# Patient Record
Sex: FEMALE | Race: WHITE | NOT HISPANIC OR LATINO | URBAN - METROPOLITAN AREA
[De-identification: names, ages, dates, MRNs, and addresses within clinical notes are randomized per-mention and may not be internally consistent; named-entity substitution may affect disease eponyms.]

---

## 2017-02-07 ENCOUNTER — EMERGENCY (EMERGENCY)
Facility: HOSPITAL | Age: 42
LOS: 1 days | Discharge: HOME | End: 2017-02-07
Attending: EMERGENCY MEDICINE | Admitting: EMERGENCY MEDICINE
Payer: MEDICAID

## 2017-02-07 VITALS
HEART RATE: 74 BPM | SYSTOLIC BLOOD PRESSURE: 113 MMHG | RESPIRATION RATE: 18 BRPM | DIASTOLIC BLOOD PRESSURE: 75 MMHG | OXYGEN SATURATION: 100 % | TEMPERATURE: 99 F

## 2017-02-07 DIAGNOSIS — M25.561 PAIN IN RIGHT KNEE: ICD-10-CM

## 2017-02-07 DIAGNOSIS — Z90.49 ACQUIRED ABSENCE OF OTHER SPECIFIED PARTS OF DIGESTIVE TRACT: Chronic | ICD-10-CM

## 2017-02-07 DIAGNOSIS — Z90.49 ACQUIRED ABSENCE OF OTHER SPECIFIED PARTS OF DIGESTIVE TRACT: ICD-10-CM

## 2017-02-07 PROCEDURE — 99284 EMERGENCY DEPT VISIT MOD MDM: CPT

## 2017-02-07 PROCEDURE — 93971 EXTREMITY STUDY: CPT

## 2017-02-07 PROCEDURE — 99284 EMERGENCY DEPT VISIT MOD MDM: CPT | Mod: 25

## 2017-02-07 NOTE — ED PROVIDER NOTE - OBJECTIVE STATEMENT
Pt. pw atraumatic right popliteal knee pain x 3 days constant worse with movement improves with ibuprofen.  No ho blood clots, DVT/PE, not on ocp's has IUD in place without hormones  Denies weakness, numbness tingling, chest pain shortness of breath.

## 2017-02-07 NOTE — ED PROVIDER NOTE - PROGRESS NOTE DETAILS
Dr. Osborne (Attending Physician)  No DVT on ultrasound. Advised to take ibuprofen and Follow up with pmd.

## 2017-02-07 NOTE — ED PROVIDER NOTE - MEDICAL DECISION MAKING DETAILS
Dr. Osborne (Attending Physician)  Pt. with atraumatic right popliteal pain, no swelling to suggest dvt, worse with extension of knee.  Knee nontender to palpation.  Likely msk knee pain but will ultrasound to eval for DVT.

## 2017-02-07 NOTE — ED PROVIDER NOTE - MUSCULOSKELETAL, MLM
Spine appears normal, range of motion is not limited, no muscle or joint tenderness. right lower extremity without swelling or edema, pain with extension of right knee, knee nontender to palpation

## 2017-02-07 NOTE — ED ADULT NURSE NOTE - OBJECTIVE STATEMENT
41 year old female no medical history with co R lower leg behind the knee pain no injury worse upon waking up improves during day but still painful no redness no swelling not warm to touch denies injury ambulatory no other complaints seen by MD pending US

## 2017-02-08 PROCEDURE — 93971 EXTREMITY STUDY: CPT | Mod: 26

## 2017-03-18 ENCOUNTER — EMERGENCY (EMERGENCY)
Facility: HOSPITAL | Age: 42
LOS: 1 days | Discharge: HOME | End: 2017-03-18
Attending: EMERGENCY MEDICINE | Admitting: EMERGENCY MEDICINE
Payer: MEDICAID

## 2017-03-18 VITALS
HEART RATE: 69 BPM | TEMPERATURE: 98 F | SYSTOLIC BLOOD PRESSURE: 105 MMHG | RESPIRATION RATE: 16 BRPM | DIASTOLIC BLOOD PRESSURE: 71 MMHG | OXYGEN SATURATION: 100 %

## 2017-03-18 VITALS
HEART RATE: 73 BPM | TEMPERATURE: 98 F | RESPIRATION RATE: 14 BRPM | WEIGHT: 145.06 LBS | DIASTOLIC BLOOD PRESSURE: 85 MMHG | SYSTOLIC BLOOD PRESSURE: 123 MMHG | OXYGEN SATURATION: 100 %

## 2017-03-18 DIAGNOSIS — Z90.49 ACQUIRED ABSENCE OF OTHER SPECIFIED PARTS OF DIGESTIVE TRACT: Chronic | ICD-10-CM

## 2017-03-18 DIAGNOSIS — R07.89 OTHER CHEST PAIN: ICD-10-CM

## 2017-03-18 PROCEDURE — 99283 EMERGENCY DEPT VISIT LOW MDM: CPT | Mod: 25

## 2017-03-18 PROCEDURE — 93005 ELECTROCARDIOGRAM TRACING: CPT

## 2017-03-18 PROCEDURE — 93010 ELECTROCARDIOGRAM REPORT: CPT

## 2017-03-18 PROCEDURE — 99284 EMERGENCY DEPT VISIT MOD MDM: CPT | Mod: 25

## 2017-03-18 NOTE — ED PROVIDER NOTE - PLAN OF CARE
1) Please return to the ED should you have any new or worsening symptoms, worsening pain, develop chest pain, difficulty breathing, nausea, vomiting, or any concerning symptoms  2) Please follow up with your primary doctor in 2-3 days

## 2017-03-18 NOTE — ED ADULT NURSE NOTE - CHPI ED SYMPTOMS NEG
no syncope/no vomiting/no shortness of breath/no nausea/no dizziness/no cough/no fever/no diaphoresis/no back pain

## 2017-03-18 NOTE — ED PROVIDER NOTE - OBJECTIVE STATEMENT
41 y/o female with no PMH p/w fatigue and 1 episode of CP with dizziness. Per patient, this evening felt fatigued and dizzy, had 1 second of chest pressure at rest that resolved. Lay down and took advil - when she awoke felt mild improvement in her symptoms. Currently has no CP. Reports a mild posterior occiput headache. Denies and dizziness, CP, SOB, cough, N/V/D, diaphoresis. On no medications. Has no allergies.  Dr. Moreno

## 2017-03-18 NOTE — ED PROVIDER NOTE - CONSTITUTIONAL, MLM
normal... Well appearing, well nourished, awake, alert, oriented to person, place, time/situation and in no apparent distress. Sitting up in bed comfortably talking with family.

## 2017-03-18 NOTE — ED PROVIDER NOTE - ATTENDING CONTRIBUTION TO CARE
------------ATTENDING NOTE------------   healthy 43 yo F c/o chest pain, pt describes > 24 hrs ago having gradual onset of mild dull frontal headache, then had mild dull ache in L chest lasting several minutes, no radiation, no sob or dyspnea, no exertional symptoms, no cough, CP resolved and has been absent since, has felt diffuse "chills" at times since and generalized weak at times, came to ED today as felt slight generalized weak today -- overall very well appearing, likely viral -vs- other, very low suspicion for more serious/acute cardiopulmonary process, nml VS, tolerating PO, asymptomatic here, in depth d/w pt/family about ddx, tx, bang goodrich.  - Ray Khan MD   ----------------------------------------------------------------------------------------------------------------------

## 2017-03-18 NOTE — ED PROVIDER NOTE - CARE PLAN
Principal Discharge DX:	Chest pain of unknown etiology Principal Discharge DX:	Chest pain of unknown etiology  Instructions for follow-up, activity and diet:	1) Please return to the ED should you have any new or worsening symptoms, worsening pain, develop chest pain, difficulty breathing, nausea, vomiting, or any concerning symptoms  2) Please follow up with your primary doctor in 2-3 days

## 2017-03-18 NOTE — ED PROVIDER NOTE - NEUROLOGICAL, MLM
Alert and oriented, no focal deficits, no motor or sensory deficits. CN2-12 grossly intact. Normal gait.

## 2017-03-18 NOTE — ED ADULT NURSE NOTE - OBJECTIVE STATEMENT
41 yo female A&OX3 presents to the ED with the c/o generalized chest tightness. Pt states tightness started yesterday and has been ongoing till today. According to the pt she is having chills. Lungs clear, equal b/l no cough and no sob or diaphoresis. Abd round, soft non tender and non distended. MAEX4

## 2017-03-18 NOTE — ED PROVIDER NOTE - INTERPRETATION
normal sinus rhythm, Normal axis, Normal GA interval and QRS complex. There are no acute ischemic ST or T-wave changes.

## 2017-10-31 ENCOUNTER — RESULT REVIEW (OUTPATIENT)
Age: 42
End: 2017-10-31

## 2018-03-20 PROBLEM — Z00.00 ENCOUNTER FOR PREVENTIVE HEALTH EXAMINATION: Status: ACTIVE | Noted: 2018-03-20

## 2018-03-27 ENCOUNTER — RECORD ABSTRACTING (OUTPATIENT)
Age: 43
End: 2018-03-27

## 2018-03-28 ENCOUNTER — APPOINTMENT (OUTPATIENT)
Dept: GYNECOLOGIC ONCOLOGY | Facility: CLINIC | Age: 43
End: 2018-03-28
Payer: COMMERCIAL

## 2018-03-28 VITALS
HEART RATE: 101 BPM | HEIGHT: 63 IN | SYSTOLIC BLOOD PRESSURE: 128 MMHG | DIASTOLIC BLOOD PRESSURE: 82 MMHG | BODY MASS INDEX: 27.46 KG/M2 | WEIGHT: 155 LBS

## 2018-03-28 DIAGNOSIS — N94.9 UNSPECIFIED CONDITION ASSOCIATED WITH FEMALE GENITAL ORGANS AND MENSTRUAL CYCLE: ICD-10-CM

## 2018-03-28 PROCEDURE — 99205 OFFICE O/P NEW HI 60 MIN: CPT

## 2018-03-30 ENCOUNTER — OUTPATIENT (OUTPATIENT)
Dept: OUTPATIENT SERVICES | Facility: HOSPITAL | Age: 43
LOS: 1 days | End: 2018-03-30
Payer: COMMERCIAL

## 2018-03-30 VITALS
TEMPERATURE: 98 F | DIASTOLIC BLOOD PRESSURE: 70 MMHG | HEIGHT: 63 IN | WEIGHT: 156.09 LBS | HEART RATE: 79 BPM | SYSTOLIC BLOOD PRESSURE: 110 MMHG | RESPIRATION RATE: 16 BRPM

## 2018-03-30 DIAGNOSIS — N94.9 UNSPECIFIED CONDITION ASSOCIATED WITH FEMALE GENITAL ORGANS AND MENSTRUAL CYCLE: ICD-10-CM

## 2018-03-30 DIAGNOSIS — Z90.49 ACQUIRED ABSENCE OF OTHER SPECIFIED PARTS OF DIGESTIVE TRACT: Chronic | ICD-10-CM

## 2018-03-30 LAB
ALBUMIN SERPL ELPH-MCNC: 4.6 G/DL — SIGNIFICANT CHANGE UP (ref 3.3–5)
ALP SERPL-CCNC: 53 U/L — SIGNIFICANT CHANGE UP (ref 40–120)
ALT FLD-CCNC: 17 U/L — SIGNIFICANT CHANGE UP (ref 4–33)
AST SERPL-CCNC: 22 U/L — SIGNIFICANT CHANGE UP (ref 4–32)
BILIRUB SERPL-MCNC: 0.4 MG/DL — SIGNIFICANT CHANGE UP (ref 0.2–1.2)
BLD GP AB SCN SERPL QL: NEGATIVE — SIGNIFICANT CHANGE UP
BUN SERPL-MCNC: 8 MG/DL — SIGNIFICANT CHANGE UP (ref 7–23)
CALCIUM SERPL-MCNC: 9.9 MG/DL — SIGNIFICANT CHANGE UP (ref 8.4–10.5)
CHLORIDE SERPL-SCNC: 99 MMOL/L — SIGNIFICANT CHANGE UP (ref 98–107)
CO2 SERPL-SCNC: 28 MMOL/L — SIGNIFICANT CHANGE UP (ref 22–31)
CREAT SERPL-MCNC: 0.65 MG/DL — SIGNIFICANT CHANGE UP (ref 0.5–1.3)
GLUCOSE SERPL-MCNC: 80 MG/DL — SIGNIFICANT CHANGE UP (ref 70–99)
HBA1C BLD-MCNC: 4.5 % — SIGNIFICANT CHANGE UP (ref 4–5.6)
HCT VFR BLD CALC: 40 % — SIGNIFICANT CHANGE UP (ref 34.5–45)
HGB BLD-MCNC: 12.4 G/DL — SIGNIFICANT CHANGE UP (ref 11.5–15.5)
MCHC RBC-ENTMCNC: 31 PG — SIGNIFICANT CHANGE UP (ref 27–34)
MCHC RBC-ENTMCNC: 31.4 % — LOW (ref 32–36)
MCV RBC AUTO: 93.6 FL — SIGNIFICANT CHANGE UP (ref 80–100)
NRBC # FLD: 0 — SIGNIFICANT CHANGE UP
PLATELET # BLD AUTO: 324 K/UL — SIGNIFICANT CHANGE UP (ref 150–400)
PMV BLD: 10 FL — SIGNIFICANT CHANGE UP (ref 7–13)
POTASSIUM SERPL-MCNC: 4.6 MMOL/L — SIGNIFICANT CHANGE UP (ref 3.5–5.3)
POTASSIUM SERPL-SCNC: 4.6 MMOL/L — SIGNIFICANT CHANGE UP (ref 3.5–5.3)
PROT SERPL-MCNC: 8.2 G/DL — SIGNIFICANT CHANGE UP (ref 6–8.3)
RBC # BLD: 2.34 M/UL — LOW (ref 3.8–5.2)
RBC # FLD: 12.6 % — SIGNIFICANT CHANGE UP (ref 10.3–14.5)
RH IG SCN BLD-IMP: POSITIVE — SIGNIFICANT CHANGE UP
SODIUM SERPL-SCNC: 138 MMOL/L — SIGNIFICANT CHANGE UP (ref 135–145)
WBC # BLD: 5.26 K/UL — SIGNIFICANT CHANGE UP (ref 3.8–10.5)
WBC # FLD AUTO: 5.26 K/UL — SIGNIFICANT CHANGE UP (ref 3.8–10.5)

## 2018-03-30 PROCEDURE — 93010 ELECTROCARDIOGRAM REPORT: CPT

## 2018-03-30 NOTE — H&P PST ADULT - NEGATIVE NEUROLOGICAL SYMPTOMS
no headache/no vertigo/no difficulty walking/no transient paralysis/no weakness/no paresthesias/no generalized seizures/no loss of sensation

## 2018-03-30 NOTE — H&P PST ADULT - HISTORY OF PRESENT ILLNESS
42 yo female with no pertinent PMH presents to pre surgical testing.  Pt report she started to experience right knee pain for about 1 year, went to orthopedist, biopsy done, positive for lymphoma.  Pt reports bone marrow biopsy earlier this week, pending results.  Pt reports PET scan done 3/2018, revealed right ovarian mass, possible malignancy.  Pt reports MRI and sonogram done as well.  Pt is scheduled for examination under anesthesia, laparoscopic unilateral salpingo oophorectomy, bilateral salpingectomy, possible staging debulking hysterectomy, port placement on 4/3/18.

## 2018-03-30 NOTE — H&P PST ADULT - NSANTHOSAYNRD_GEN_A_CORE
No. JERZY screening performed.  STOP BANG Legend: 0-2 = LOW Risk; 3-4 = INTERMEDIATE Risk; 5-8 = HIGH Risk

## 2018-03-30 NOTE — H&P PST ADULT - NEGATIVE CARDIOVASCULAR SYMPTOMS
no claudication/no peripheral edema/no chest pain/no palpitations/no dyspnea on exertion/no orthopnea/no paroxysmal nocturnal dyspnea

## 2018-03-30 NOTE — H&P PST ADULT - NEGATIVE MUSCULOSKELETAL SYMPTOMS
no joint swelling/no back pain/no leg pain L/no neck pain/no arm pain R/no muscle cramps/no muscle weakness/no myalgia/no arm pain L/no arthralgia/no stiffness

## 2018-03-30 NOTE — H&P PST ADULT - GENITOURINARY COMMENTS
"right ovarian mass" preop dx. unspecified condition associated with female genital organs and menstrual cycle

## 2018-03-30 NOTE — H&P PST ADULT - PROBLEM SELECTOR PLAN 1
Pt is scheduled for examination under anesthesia, laparoscopic unilateral salpingo oophorectomy, bilateral salpingectomy, possible staging debulking hysterectomy, port placement on 4/3/18.   Pre op instructions including chlorhexidine wash given and pt able to verbalize understanding.   Sterile cup given, pt instructed to bring urine sample AM of surgery for UCG and pt able to verbalize understanding.

## 2018-03-30 NOTE — H&P PST ADULT - MUSCULOSKELETAL
detailed exam no joint erythema/right knee/decreased ROM due to pain/no joint warmth/no calf tenderness/joint swelling details…

## 2018-03-30 NOTE — H&P PST ADULT - NEGATIVE ENMT SYMPTOMS
no dysphagia/no ear pain/no nasal congestion/no hearing difficulty/no tinnitus/no vertigo/no sinus symptoms

## 2018-04-02 ENCOUNTER — TRANSCRIPTION ENCOUNTER (OUTPATIENT)
Age: 43
End: 2018-04-02

## 2018-04-02 ENCOUNTER — RESULT REVIEW (OUTPATIENT)
Age: 43
End: 2018-04-02

## 2018-04-02 DIAGNOSIS — R19.00 INTRA-ABDOMINAL AND PELVIC SWELLING, MASS AND LUMP, UNSPECIFIED SITE: ICD-10-CM

## 2018-04-03 ENCOUNTER — RESULT REVIEW (OUTPATIENT)
Age: 43
End: 2018-04-03

## 2018-04-03 ENCOUNTER — APPOINTMENT (OUTPATIENT)
Dept: GYNECOLOGIC ONCOLOGY | Facility: HOSPITAL | Age: 43
End: 2018-04-03

## 2018-04-03 ENCOUNTER — OUTPATIENT (OUTPATIENT)
Dept: OUTPATIENT SERVICES | Facility: HOSPITAL | Age: 43
LOS: 1 days | Discharge: HOME | End: 2018-04-03
Payer: COMMERCIAL

## 2018-04-03 VITALS
RESPIRATION RATE: 17 BRPM | TEMPERATURE: 98 F | HEART RATE: 65 BPM | SYSTOLIC BLOOD PRESSURE: 101 MMHG | OXYGEN SATURATION: 100 % | DIASTOLIC BLOOD PRESSURE: 61 MMHG

## 2018-04-03 VITALS
SYSTOLIC BLOOD PRESSURE: 113 MMHG | DIASTOLIC BLOOD PRESSURE: 66 MMHG | RESPIRATION RATE: 16 BRPM | HEIGHT: 63 IN | TEMPERATURE: 98 F | HEART RATE: 82 BPM | WEIGHT: 156.09 LBS | OXYGEN SATURATION: 99 %

## 2018-04-03 DIAGNOSIS — N94.9 UNSPECIFIED CONDITION ASSOCIATED WITH FEMALE GENITAL ORGANS AND MENSTRUAL CYCLE: ICD-10-CM

## 2018-04-03 DIAGNOSIS — Z90.49 ACQUIRED ABSENCE OF OTHER SPECIFIED PARTS OF DIGESTIVE TRACT: Chronic | ICD-10-CM

## 2018-04-03 LAB
BLD GP AB SCN SERPL QL: NEGATIVE — SIGNIFICANT CHANGE UP
DAT C3-SP REAG RBC QL: NEGATIVE — SIGNIFICANT CHANGE UP
DAT POLY-SP REAG RBC QL: NEGATIVE — SIGNIFICANT CHANGE UP
DIRECT COOMBS IGG: NEGATIVE — SIGNIFICANT CHANGE UP
HCG UR QL: NEGATIVE — SIGNIFICANT CHANGE UP
RH IG SCN BLD-IMP: POSITIVE — SIGNIFICANT CHANGE UP

## 2018-04-03 PROCEDURE — 88360 TUMOR IMMUNOHISTOCHEM/MANUAL: CPT | Mod: 26

## 2018-04-03 PROCEDURE — 88112 CYTOPATH CELL ENHANCE TECH: CPT | Mod: 26

## 2018-04-03 PROCEDURE — 88189 FLOWCYTOMETRY/READ 16 & >: CPT

## 2018-04-03 PROCEDURE — 58661 LAPAROSCOPY REMOVE ADNEXA: CPT

## 2018-04-03 PROCEDURE — 88307 TISSUE EXAM BY PATHOLOGIST: CPT | Mod: 26

## 2018-04-03 PROCEDURE — 88305 TISSUE EXAM BY PATHOLOGIST: CPT | Mod: 26,59

## 2018-04-03 PROCEDURE — 86077 PHYS BLOOD BANK SERV XMATCH: CPT

## 2018-04-03 PROCEDURE — 88341 IMHCHEM/IMCYTCHM EA ADD ANTB: CPT | Mod: 26,59

## 2018-04-03 PROCEDURE — 88305 TISSUE EXAM BY PATHOLOGIST: CPT | Mod: 26

## 2018-04-03 PROCEDURE — 88342 IMHCHEM/IMCYTCHM 1ST ANTB: CPT | Mod: 26,59

## 2018-04-03 RX ORDER — FENTANYL CITRATE 50 UG/ML
50 INJECTION INTRAVENOUS
Qty: 0 | Refills: 0 | Status: DISCONTINUED | OUTPATIENT
Start: 2018-04-03 | End: 2018-04-03

## 2018-04-03 RX ORDER — SODIUM CHLORIDE 9 MG/ML
1000 INJECTION, SOLUTION INTRAVENOUS
Qty: 0 | Refills: 0 | Status: DISCONTINUED | OUTPATIENT
Start: 2018-04-03 | End: 2018-04-18

## 2018-04-03 RX ORDER — IBUPROFEN 200 MG
3 TABLET ORAL
Qty: 60 | Refills: 0 | OUTPATIENT
Start: 2018-04-03 | End: 2018-04-07

## 2018-04-03 RX ORDER — ONDANSETRON 8 MG/1
4 TABLET, FILM COATED ORAL ONCE
Qty: 0 | Refills: 0 | Status: COMPLETED | OUTPATIENT
Start: 2018-04-03 | End: 2018-04-03

## 2018-04-03 RX ORDER — ACETAMINOPHEN 500 MG
3 TABLET ORAL
Qty: 36 | Refills: 0 | OUTPATIENT
Start: 2018-04-03 | End: 2018-04-06

## 2018-04-03 RX ADMIN — Medication 200 MILLIGRAM(S): at 18:44

## 2018-04-03 RX ADMIN — ONDANSETRON 4 MILLIGRAM(S): 8 TABLET, FILM COATED ORAL at 17:44

## 2018-04-03 NOTE — ASU DISCHARGE PLAN (ADULT/PEDIATRIC). - NURSING INSTRUCTIONS
No creams, lotions, powders  or ointments to incision site. Do not scrub incisions while showering. Pat dry after shower.     DO NOT take any Tylenol (Acetaminophen) or narcotics containing Tylenol (PERCOCET) until after 7:00pm . You received Tylenol during your operation and it can cause damage to your liver if too much is taken within a 24 hour time period.

## 2018-04-03 NOTE — ASU DISCHARGE PLAN (ADULT/PEDIATRIC). - MEDICATION SUMMARY - MEDICATIONS TO TAKE
I will START or STAY ON the medications listed below when I get home from the hospital:    Tylenol 325 mg oral tablet  -- 3 tab(s) by mouth every 8 hours MDD:4  -- This product contains acetaminophen.  Do not use  with any other product containing acetaminophen to prevent possible liver damage.    -- Indication: For pain    Motrin  mg oral tablet  -- 3 tab(s) by mouth every 6 hours MDD:4   -- Do not take this drug if you are pregnant.  It is very important that you take or use this exactly as directed.  Do not skip doses or discontinue unless directed by your doctor.  May cause drowsiness or dizziness.  Obtain medical advice before taking any non-prescription drugs as some may affect the action of this medication.  Take with food or milk.    -- Indication: For pain I will START or STAY ON the medications listed below when I get home from the hospital:    Motrin  mg oral tablet  -- 3 tab(s) by mouth every 6 hours MDD:4   -- Do not take this drug if you are pregnant.  It is very important that you take or use this exactly as directed.  Do not skip doses or discontinue unless directed by your doctor.  May cause drowsiness or dizziness.  Obtain medical advice before taking any non-prescription drugs as some may affect the action of this medication.  Take with food or milk.    -- Indication: For moderate pain    Percocet 5/325 oral tablet  -- 1 tab(s) by mouth every 6 hours MDD:4  -- Caution federal law prohibits the transfer of this drug to any person other  than the person for whom it was prescribed.  May cause drowsiness.  Alcohol may intensify this effect.  Use care when operating dangerous machinery.  This prescription cannot be refilled.  This product contains acetaminophen.  Do not use  with any other product containing acetaminophen to prevent possible liver damage.  Using more of this medication than prescribed may cause serious breathing problems.    -- Indication: For severe pain

## 2018-04-03 NOTE — ASU DISCHARGE PLAN (ADULT/PEDIATRIC). - SPECIAL INSTRUCTIONS
Take tylenol as need for mild pain. Take motrin as needed for moderate pain Take motrin as needed for moderate pain. take percocet as needed for severe pain

## 2018-04-03 NOTE — ASU DISCHARGE PLAN (ADULT/PEDIATRIC). - ACTIVITY LEVEL
nothing per rectum/no tampons/no intercourse/no douching/weight bearing as tolerated nothing per rectum/no tampons/no intercourse/no douching/weight bearing as tolerated/no tub baths

## 2018-04-03 NOTE — ASU DISCHARGE PLAN (ADULT/PEDIATRIC). - INSTRUCTIONS
Call MD office to schedule follow up appointment Keep first meal light. Nothing fried, spicy or greasy. Increase fluids.

## 2018-04-03 NOTE — ASU DISCHARGE PLAN (ADULT/PEDIATRIC). - PAIN
prescription called to pharmacy/Narcotic pain medication may cause nausea or constipation. Take medication with food. Increase fluids and fiber intake.

## 2018-04-05 LAB — ANTIBODY ID 1_1: SIGNIFICANT CHANGE UP

## 2018-04-06 ENCOUNTER — OUTPATIENT (OUTPATIENT)
Dept: OUTPATIENT SERVICES | Facility: HOSPITAL | Age: 43
LOS: 1 days | Discharge: HOME | End: 2018-04-06
Payer: COMMERCIAL

## 2018-04-06 DIAGNOSIS — C85.90 NON-HODGKIN LYMPHOMA, UNSPECIFIED, UNSPECIFIED SITE: ICD-10-CM

## 2018-04-06 DIAGNOSIS — Z90.49 ACQUIRED ABSENCE OF OTHER SPECIFIED PARTS OF DIGESTIVE TRACT: Chronic | ICD-10-CM

## 2018-04-10 ENCOUNTER — RESULT REVIEW (OUTPATIENT)
Age: 43
End: 2018-04-10

## 2018-04-10 ENCOUNTER — APPOINTMENT (OUTPATIENT)
Dept: HEMATOLOGY ONCOLOGY | Facility: CLINIC | Age: 43
End: 2018-04-10
Payer: COMMERCIAL

## 2018-04-10 VITALS
WEIGHT: 153.22 LBS | HEART RATE: 73 BPM | BODY MASS INDEX: 26.48 KG/M2 | SYSTOLIC BLOOD PRESSURE: 125 MMHG | HEIGHT: 63.9 IN | OXYGEN SATURATION: 99 % | RESPIRATION RATE: 16 BRPM | DIASTOLIC BLOOD PRESSURE: 77 MMHG | TEMPERATURE: 97.7 F

## 2018-04-10 LAB
BASOPHILS # BLD AUTO: 0.1 K/UL — SIGNIFICANT CHANGE UP (ref 0–0.2)
BASOPHILS NFR BLD AUTO: 1 % — SIGNIFICANT CHANGE UP (ref 0–2)
EOSINOPHIL # BLD AUTO: 0.5 K/UL — SIGNIFICANT CHANGE UP (ref 0–0.5)
EOSINOPHIL NFR BLD AUTO: 7.3 % — HIGH (ref 0–6)
HCT VFR BLD CALC: 38.6 % — SIGNIFICANT CHANGE UP (ref 34.5–45)
HGB BLD-MCNC: 13.4 G/DL — SIGNIFICANT CHANGE UP (ref 11.5–15.5)
LYMPHOCYTES # BLD AUTO: 1.5 K/UL — SIGNIFICANT CHANGE UP (ref 1–3.3)
LYMPHOCYTES # BLD AUTO: 21.5 % — SIGNIFICANT CHANGE UP (ref 13–44)
MCHC RBC-ENTMCNC: 30.4 PG — SIGNIFICANT CHANGE UP (ref 27–34)
MCHC RBC-ENTMCNC: 34.6 G/DL — SIGNIFICANT CHANGE UP (ref 32–36)
MCV RBC AUTO: 87.9 FL — SIGNIFICANT CHANGE UP (ref 80–100)
MONOCYTES # BLD AUTO: 0.4 K/UL — SIGNIFICANT CHANGE UP (ref 0–0.9)
MONOCYTES NFR BLD AUTO: 6.2 % — SIGNIFICANT CHANGE UP (ref 2–14)
NEUTROPHILS # BLD AUTO: 4.5 K/UL — SIGNIFICANT CHANGE UP (ref 1.8–7.4)
NEUTROPHILS NFR BLD AUTO: 64 % — SIGNIFICANT CHANGE UP (ref 43–77)
PLATELET # BLD AUTO: 338 K/UL — SIGNIFICANT CHANGE UP (ref 150–400)
RBC # BLD: 4.4 M/UL — SIGNIFICANT CHANGE UP (ref 3.8–5.2)
RBC # FLD: 12 % — SIGNIFICANT CHANGE UP (ref 10.3–14.5)
WBC # BLD: 7 K/UL — SIGNIFICANT CHANGE UP (ref 3.8–10.5)
WBC # FLD AUTO: 7 K/UL — SIGNIFICANT CHANGE UP (ref 3.8–10.5)

## 2018-04-10 PROCEDURE — 99205 OFFICE O/P NEW HI 60 MIN: CPT

## 2018-04-10 RX ORDER — METOCLOPRAMIDE 10 MG/1
10 TABLET ORAL
Qty: 30 | Refills: 1 | Status: ACTIVE | COMMUNITY
Start: 2018-04-10 | End: 1900-01-01

## 2018-04-11 ENCOUNTER — APPOINTMENT (OUTPATIENT)
Dept: THORACIC SURGERY | Facility: CLINIC | Age: 43
End: 2018-04-11
Payer: COMMERCIAL

## 2018-04-11 ENCOUNTER — OTHER (OUTPATIENT)
Age: 43
End: 2018-04-11

## 2018-04-11 DIAGNOSIS — Z85.79 PERSONAL HISTORY OF OTHER MALIGNANT NEOPLASMS OF LYMPHOID, HEMATOPOIETIC AND RELATED TISSUES: ICD-10-CM

## 2018-04-11 DIAGNOSIS — N83.9 NONINFLAMMATORY DISORDER OF OVARY, FALLOPIAN TUBE AND BROAD LIGAMENT, UNSPECIFIED: ICD-10-CM

## 2018-04-11 LAB
ALBUMIN SERPL ELPH-MCNC: 4.6 G/DL
ALP BLD-CCNC: 54 U/L
ALT SERPL-CCNC: 42 U/L
ANION GAP SERPL CALC-SCNC: 14 MMOL/L
APTT BLD: 32.6 SEC
AST SERPL-CCNC: 17 U/L
BILIRUB SERPL-MCNC: 0.3 MG/DL
BUN SERPL-MCNC: 7 MG/DL
CALCIUM SERPL-MCNC: 9.6 MG/DL
CHLORIDE SERPL-SCNC: 100 MMOL/L
CO2 SERPL-SCNC: 25 MMOL/L
CREAT SERPL-MCNC: 0.62 MG/DL
GLUCOSE SERPL-MCNC: 121 MG/DL
INR PPP: 1.03 RATIO
LDH SERPL-CCNC: 227 U/L
POTASSIUM SERPL-SCNC: 4.5 MMOL/L
PROT SERPL-MCNC: 8.3 G/DL
PT BLD: 11.6 SEC
SODIUM SERPL-SCNC: 139 MMOL/L
URATE SERPL-MCNC: 3 MG/DL

## 2018-04-11 PROCEDURE — 99205 OFFICE O/P NEW HI 60 MIN: CPT

## 2018-04-11 RX ORDER — PREDNISONE 50 MG/1
50 TABLET ORAL
Qty: 16 | Refills: 2 | Status: ACTIVE | COMMUNITY
Start: 2018-04-11 | End: 1900-01-01

## 2018-04-12 VITALS
BODY MASS INDEX: 26.44 KG/M2 | WEIGHT: 153 LBS | HEART RATE: 83 BPM | RESPIRATION RATE: 16 BRPM | DIASTOLIC BLOOD PRESSURE: 65 MMHG | HEIGHT: 63.9 IN | OXYGEN SATURATION: 98 % | SYSTOLIC BLOOD PRESSURE: 105 MMHG

## 2018-04-12 PROBLEM — N83.9 MASS OF OVARY: Status: RESOLVED | Noted: 2018-04-12 | Resolved: 2018-04-12

## 2018-04-12 PROBLEM — Z85.79 HISTORY OF LYMPHOMA: Status: RESOLVED | Noted: 2018-04-12 | Resolved: 2018-04-12

## 2018-04-13 ENCOUNTER — APPOINTMENT (OUTPATIENT)
Dept: THORACIC SURGERY | Facility: HOSPITAL | Age: 43
End: 2018-04-13

## 2018-04-13 ENCOUNTER — OUTPATIENT (OUTPATIENT)
Dept: OUTPATIENT SERVICES | Facility: HOSPITAL | Age: 43
LOS: 1 days | Discharge: HOME | End: 2018-04-13
Payer: COMMERCIAL

## 2018-04-13 VITALS
RESPIRATION RATE: 16 BRPM | WEIGHT: 156.09 LBS | DIASTOLIC BLOOD PRESSURE: 64 MMHG | OXYGEN SATURATION: 98 % | HEART RATE: 69 BPM | TEMPERATURE: 98 F | HEIGHT: 63 IN | SYSTOLIC BLOOD PRESSURE: 100 MMHG

## 2018-04-13 VITALS
DIASTOLIC BLOOD PRESSURE: 70 MMHG | HEART RATE: 57 BPM | SYSTOLIC BLOOD PRESSURE: 113 MMHG | TEMPERATURE: 98 F | RESPIRATION RATE: 16 BRPM | OXYGEN SATURATION: 99 %

## 2018-04-13 DIAGNOSIS — Z98.890 OTHER SPECIFIED POSTPROCEDURAL STATES: Chronic | ICD-10-CM

## 2018-04-13 DIAGNOSIS — Z90.49 ACQUIRED ABSENCE OF OTHER SPECIFIED PARTS OF DIGESTIVE TRACT: Chronic | ICD-10-CM

## 2018-04-13 DIAGNOSIS — C85.90 NON-HODGKIN LYMPHOMA, UNSPECIFIED, UNSPECIFIED SITE: ICD-10-CM

## 2018-04-13 LAB — HCG UR QL: NEGATIVE — SIGNIFICANT CHANGE UP

## 2018-04-13 PROCEDURE — 77001 FLUOROGUIDE FOR VEIN DEVICE: CPT | Mod: 26

## 2018-04-13 PROCEDURE — 36561 INSERT TUNNELED CV CATH: CPT | Mod: LT

## 2018-04-13 PROCEDURE — 71045 X-RAY EXAM CHEST 1 VIEW: CPT | Mod: 26

## 2018-04-13 NOTE — BRIEF OPERATIVE NOTE - PROCEDURE
<<-----Click on this checkbox to enter Procedure Venous access port placement  04/13/2018    Active  GERI

## 2018-04-13 NOTE — ASU DISCHARGE PLAN (ADULT/PEDIATRIC). - MEDICATION SUMMARY - MEDICATIONS TO CHANGE
I will SWITCH the dose or number of times a day I take the medications listed below when I get home from the hospital:    Motrin  mg oral tablet  -- 3 tab(s) by mouth every 6 hours MDD:4   -- Do not take this drug if you are pregnant.  It is very important that you take or use this exactly as directed.  Do not skip doses or discontinue unless directed by your doctor.  May cause drowsiness or dizziness.  Obtain medical advice before taking any non-prescription drugs as some may affect the action of this medication.  Take with food or milk.    Percocet 5/325 oral tablet  -- 1 tab(s) by mouth every 6 hours MDD:4  -- Caution federal law prohibits the transfer of this drug to any person other  than the person for whom it was prescribed.  May cause drowsiness.  Alcohol may intensify this effect.  Use care when operating dangerous machinery.  This prescription cannot be refilled.  This product contains acetaminophen.  Do not use  with any other product containing acetaminophen to prevent possible liver damage.  Using more of this medication than prescribed may cause serious breathing problems.

## 2018-04-13 NOTE — ASU DISCHARGE PLAN (ADULT/PEDIATRIC). - NOTIFY
Swelling that continues/Fever greater than 101/Bleeding that does not stop/Pain not relieved by Medications/shortness of breath/Numbness, color, or temperature change to extremity

## 2018-04-13 NOTE — ASU DISCHARGE PLAN (ADULT/PEDIATRIC). - BATHING
keep surgical dressing dry and in place until Sunday, then remove dressing and shower. l. You may leave wound uncovered after that/shower only

## 2018-04-13 NOTE — ASU DISCHARGE PLAN (ADULT/PEDIATRIC). - MEDICATION SUMMARY - MEDICATIONS TO TAKE
I will START or STAY ON the medications listed below when I get home from the hospital:    Advil  -- 400 milligram(s) by mouth every 8 hours, As Needed for mild pain  -- Indication: For mild pain    Percocet 5/325 oral tablet  -- 1 tab(s) by mouth every 4 hours, As Needed -for moderate pain MDD:6-2  -- Caution federal law prohibits the transfer of this drug to any person other  than the person for whom it was prescribed.  May cause drowsiness.  Alcohol may intensify this effect.  Use care when operating dangerous machinery.  This prescription cannot be refilled.  This product contains acetaminophen.  Do not use  with any other product containing acetaminophen to prevent possible liver damage.  Using more of this medication than prescribed may cause serious breathing problems.    -- Indication: For moderate pain

## 2018-04-15 ENCOUNTER — FORM ENCOUNTER (OUTPATIENT)
Age: 43
End: 2018-04-15

## 2018-04-16 ENCOUNTER — APPOINTMENT (OUTPATIENT)
Dept: INFUSION THERAPY | Facility: HOSPITAL | Age: 43
End: 2018-04-16

## 2018-04-16 ENCOUNTER — APPOINTMENT (OUTPATIENT)
Dept: GYNECOLOGIC ONCOLOGY | Facility: CLINIC | Age: 43
End: 2018-04-16

## 2018-04-16 ENCOUNTER — RESULT REVIEW (OUTPATIENT)
Age: 43
End: 2018-04-16

## 2018-04-16 ENCOUNTER — OUTPATIENT (OUTPATIENT)
Dept: OUTPATIENT SERVICES | Facility: HOSPITAL | Age: 43
LOS: 1 days | End: 2018-04-16
Payer: COMMERCIAL

## 2018-04-16 ENCOUNTER — APPOINTMENT (OUTPATIENT)
Dept: RADIOLOGY | Facility: HOSPITAL | Age: 43
End: 2018-04-16

## 2018-04-16 DIAGNOSIS — Z98.890 OTHER SPECIFIED POSTPROCEDURAL STATES: Chronic | ICD-10-CM

## 2018-04-16 DIAGNOSIS — Z90.49 ACQUIRED ABSENCE OF OTHER SPECIFIED PARTS OF DIGESTIVE TRACT: Chronic | ICD-10-CM

## 2018-04-16 DIAGNOSIS — I25.10 ATHEROSCLEROTIC HEART DISEASE OF NATIVE CORONARY ARTERY WITHOUT ANGINA PECTORIS: ICD-10-CM

## 2018-04-16 DIAGNOSIS — C85.90 NON-HODGKIN LYMPHOMA, UNSPECIFIED, UNSPECIFIED SITE: ICD-10-CM

## 2018-04-16 LAB
APPEARANCE CSF: CLEAR — SIGNIFICANT CHANGE UP
BASOPHILS # BLD AUTO: 0.1 K/UL — SIGNIFICANT CHANGE UP (ref 0–0.2)
BASOPHILS NFR BLD AUTO: 1 % — SIGNIFICANT CHANGE UP (ref 0–2)
COLOR CSF: SIGNIFICANT CHANGE UP
EOSINOPHIL # BLD AUTO: 0.6 K/UL — HIGH (ref 0–0.5)
EOSINOPHIL NFR BLD AUTO: 8.1 % — HIGH (ref 0–6)
GLUCOSE CSF-MCNC: 68 MG/DL — SIGNIFICANT CHANGE UP (ref 40–70)
GRAM STN FLD: SIGNIFICANT CHANGE UP
HCT VFR BLD CALC: 39.4 % — SIGNIFICANT CHANGE UP (ref 34.5–45)
HGB BLD-MCNC: 13.5 G/DL — SIGNIFICANT CHANGE UP (ref 11.5–15.5)
LYMPHOCYTES # BLD AUTO: 1.6 K/UL — SIGNIFICANT CHANGE UP (ref 1–3.3)
LYMPHOCYTES # BLD AUTO: 23.2 % — SIGNIFICANT CHANGE UP (ref 13–44)
MCHC RBC-ENTMCNC: 30.3 PG — SIGNIFICANT CHANGE UP (ref 27–34)
MCHC RBC-ENTMCNC: 34.1 G/DL — SIGNIFICANT CHANGE UP (ref 32–36)
MCV RBC AUTO: 88.7 FL — SIGNIFICANT CHANGE UP (ref 80–100)
MONOCYTES # BLD AUTO: 0.4 K/UL — SIGNIFICANT CHANGE UP (ref 0–0.9)
MONOCYTES NFR BLD AUTO: 5.9 % — SIGNIFICANT CHANGE UP (ref 2–14)
NEUTROPHILS # BLD AUTO: 4.3 K/UL — SIGNIFICANT CHANGE UP (ref 1.8–7.4)
NEUTROPHILS # CSF: 0 % — SIGNIFICANT CHANGE UP (ref 0–6)
NEUTROPHILS NFR BLD AUTO: 61.8 % — SIGNIFICANT CHANGE UP (ref 43–77)
NRBC NFR CSF: <1 — SIGNIFICANT CHANGE UP (ref 0–5)
PLATELET # BLD AUTO: 257 K/UL — SIGNIFICANT CHANGE UP (ref 150–400)
PROT CSF-MCNC: 31 MG/DL — SIGNIFICANT CHANGE UP (ref 15–45)
RBC # BLD: 4.45 M/UL — SIGNIFICANT CHANGE UP (ref 3.8–5.2)
RBC # CSF: 3 /UL — HIGH (ref 0–0)
RBC # FLD: 12.2 % — SIGNIFICANT CHANGE UP (ref 10.3–14.5)
SPECIMEN SOURCE: SIGNIFICANT CHANGE UP
TUBE TYPE: SIGNIFICANT CHANGE UP
WBC # BLD: 7 K/UL — SIGNIFICANT CHANGE UP (ref 3.8–10.5)
WBC # FLD AUTO: 7 K/UL — SIGNIFICANT CHANGE UP (ref 3.8–10.5)

## 2018-04-16 PROCEDURE — 62272 THER SPI PNXR DRG CSF: CPT

## 2018-04-16 PROCEDURE — 82945 GLUCOSE OTHER FLUID: CPT

## 2018-04-16 PROCEDURE — 87205 SMEAR GRAM STAIN: CPT

## 2018-04-16 PROCEDURE — 84157 ASSAY OF PROTEIN OTHER: CPT

## 2018-04-16 PROCEDURE — 77003 FLUOROGUIDE FOR SPINE INJECT: CPT

## 2018-04-16 PROCEDURE — 77003 FLUOROGUIDE FOR SPINE INJECT: CPT | Mod: 26

## 2018-04-16 PROCEDURE — 87070 CULTURE OTHR SPECIMN AEROBIC: CPT

## 2018-04-16 PROCEDURE — 89051 BODY FLUID CELL COUNT: CPT

## 2018-04-16 PROCEDURE — 88108 CYTOPATH CONCENTRATE TECH: CPT | Mod: 26

## 2018-04-16 RX ORDER — METHOTREXATE 2.5 MG/1
12 TABLET ORAL ONCE
Qty: 0 | Refills: 0 | Status: DISCONTINUED | OUTPATIENT
Start: 2018-04-16 | End: 2018-05-01

## 2018-04-16 RX ORDER — IBUPROFEN 200 MG
400 TABLET ORAL
Qty: 0 | Refills: 0 | COMMUNITY

## 2018-04-17 ENCOUNTER — RESULT REVIEW (OUTPATIENT)
Age: 43
End: 2018-04-17

## 2018-04-17 DIAGNOSIS — R11.2 NAUSEA WITH VOMITING, UNSPECIFIED: ICD-10-CM

## 2018-04-17 DIAGNOSIS — Z51.11 ENCOUNTER FOR ANTINEOPLASTIC CHEMOTHERAPY: ICD-10-CM

## 2018-04-17 PROCEDURE — 88321 CONSLTJ&REPRT SLD PREP ELSWR: CPT

## 2018-04-18 PROBLEM — Z00.00 ENCOUNTER FOR PREVENTIVE HEALTH EXAMINATION: Noted: 2018-04-18

## 2018-04-18 LAB — TM INTERPRETATION: SIGNIFICANT CHANGE UP

## 2018-04-19 LAB
CULTURE RESULTS: NO GROWTH — SIGNIFICANT CHANGE UP
SPECIMEN SOURCE: SIGNIFICANT CHANGE UP

## 2018-04-23 ENCOUNTER — RESULT REVIEW (OUTPATIENT)
Age: 43
End: 2018-04-23

## 2018-04-23 ENCOUNTER — APPOINTMENT (OUTPATIENT)
Dept: HEMATOLOGY ONCOLOGY | Facility: CLINIC | Age: 43
End: 2018-04-23
Payer: COMMERCIAL

## 2018-04-23 VITALS
TEMPERATURE: 97.5 F | OXYGEN SATURATION: 98 % | DIASTOLIC BLOOD PRESSURE: 81 MMHG | RESPIRATION RATE: 16 BRPM | WEIGHT: 153 LBS | BODY MASS INDEX: 26.34 KG/M2 | HEART RATE: 89 BPM | SYSTOLIC BLOOD PRESSURE: 118 MMHG

## 2018-04-23 LAB
BASOPHILS # BLD AUTO: 0 K/UL — SIGNIFICANT CHANGE UP (ref 0–0.2)
BASOPHILS NFR BLD AUTO: 0.5 % — SIGNIFICANT CHANGE UP (ref 0–2)
EOSINOPHIL # BLD AUTO: 0.3 K/UL — SIGNIFICANT CHANGE UP (ref 0–0.5)
EOSINOPHIL NFR BLD AUTO: 11 % — HIGH (ref 0–6)
HCT VFR BLD CALC: 36.2 % — SIGNIFICANT CHANGE UP (ref 34.5–45)
HGB BLD-MCNC: 12.3 G/DL — SIGNIFICANT CHANGE UP (ref 11.5–15.5)
LYMPHOCYTES # BLD AUTO: 0.6 K/UL — LOW (ref 1–3.3)
LYMPHOCYTES # BLD AUTO: 21.1 % — SIGNIFICANT CHANGE UP (ref 13–44)
MCHC RBC-ENTMCNC: 29.6 PG — SIGNIFICANT CHANGE UP (ref 27–34)
MCHC RBC-ENTMCNC: 33.9 G/DL — SIGNIFICANT CHANGE UP (ref 32–36)
MCV RBC AUTO: 87.4 FL — SIGNIFICANT CHANGE UP (ref 80–100)
MONOCYTES # BLD AUTO: 0 K/UL — SIGNIFICANT CHANGE UP (ref 0–0.9)
MONOCYTES NFR BLD AUTO: 0.8 % — LOW (ref 2–14)
NEUTROPHILS # BLD AUTO: 1.9 K/UL — SIGNIFICANT CHANGE UP (ref 1.8–7.4)
NEUTROPHILS NFR BLD AUTO: 66.7 % — SIGNIFICANT CHANGE UP (ref 43–77)
PLAT MORPH BLD: NORMAL — SIGNIFICANT CHANGE UP
PLATELET # BLD AUTO: 231 K/UL — SIGNIFICANT CHANGE UP (ref 150–400)
RBC # BLD: 4.14 M/UL — SIGNIFICANT CHANGE UP (ref 3.8–5.2)
RBC # FLD: 11.7 % — SIGNIFICANT CHANGE UP (ref 10.3–14.5)
RBC BLD AUTO: NORMAL — SIGNIFICANT CHANGE UP
WBC # BLD: 2.9 K/UL — LOW (ref 3.8–10.5)
WBC # FLD AUTO: 2.9 K/UL — LOW (ref 3.8–10.5)

## 2018-04-23 PROCEDURE — 99214 OFFICE O/P EST MOD 30 MIN: CPT

## 2018-04-23 RX ORDER — ONDANSETRON 8 MG/1
8 TABLET, ORALLY DISINTEGRATING ORAL
Qty: 30 | Refills: 2 | Status: ACTIVE | COMMUNITY
Start: 2018-04-23 | End: 1900-01-01

## 2018-04-24 ENCOUNTER — APPOINTMENT (OUTPATIENT)
Dept: ORTHOPEDIC SURGERY | Facility: CLINIC | Age: 43
End: 2018-04-24
Payer: COMMERCIAL

## 2018-04-24 VITALS — HEIGHT: 63 IN | BODY MASS INDEX: 26.22 KG/M2 | WEIGHT: 148 LBS

## 2018-04-24 LAB
ALBUMIN SERPL ELPH-MCNC: 4.4 G/DL
ALP BLD-CCNC: 39 U/L
ALT SERPL-CCNC: 15 U/L
ANION GAP SERPL CALC-SCNC: 13 MMOL/L
AST SERPL-CCNC: 10 U/L
BILIRUB SERPL-MCNC: 0.7 MG/DL
BUN SERPL-MCNC: 6 MG/DL
CALCIUM SERPL-MCNC: 9.7 MG/DL
CHLORIDE SERPL-SCNC: 97 MMOL/L
CO2 SERPL-SCNC: 26 MMOL/L
CREAT SERPL-MCNC: 0.61 MG/DL
GLUCOSE SERPL-MCNC: 106 MG/DL
LDH SERPL-CCNC: 153 U/L
POTASSIUM SERPL-SCNC: 4.8 MMOL/L
PROT SERPL-MCNC: 7.2 G/DL
SODIUM SERPL-SCNC: 136 MMOL/L

## 2018-04-24 PROCEDURE — 73562 X-RAY EXAM OF KNEE 3: CPT | Mod: RT

## 2018-04-24 PROCEDURE — 99204 OFFICE O/P NEW MOD 45 MIN: CPT

## 2018-05-02 ENCOUNTER — EMERGENCY (EMERGENCY)
Facility: HOSPITAL | Age: 43
LOS: 1 days | Discharge: LEFT BEFORE DISCHARGE | End: 2018-05-02
Attending: EMERGENCY MEDICINE

## 2018-05-02 VITALS
SYSTOLIC BLOOD PRESSURE: 107 MMHG | OXYGEN SATURATION: 100 % | HEART RATE: 72 BPM | RESPIRATION RATE: 18 BRPM | DIASTOLIC BLOOD PRESSURE: 55 MMHG

## 2018-05-02 VITALS
OXYGEN SATURATION: 98 % | TEMPERATURE: 98 F | RESPIRATION RATE: 16 BRPM | DIASTOLIC BLOOD PRESSURE: 77 MMHG | HEART RATE: 75 BPM | WEIGHT: 139.99 LBS | SYSTOLIC BLOOD PRESSURE: 113 MMHG

## 2018-05-02 DIAGNOSIS — Z98.890 OTHER SPECIFIED POSTPROCEDURAL STATES: Chronic | ICD-10-CM

## 2018-05-02 DIAGNOSIS — Z90.49 ACQUIRED ABSENCE OF OTHER SPECIFIED PARTS OF DIGESTIVE TRACT: Chronic | ICD-10-CM

## 2018-05-02 RX ORDER — IBUPROFEN 200 MG
600 TABLET ORAL ONCE
Qty: 0 | Refills: 0 | Status: COMPLETED | OUTPATIENT
Start: 2018-05-02 | End: 2018-05-02

## 2018-05-02 RX ORDER — OXYCODONE HYDROCHLORIDE 5 MG/1
5 TABLET ORAL ONCE
Qty: 0 | Refills: 0 | Status: DISCONTINUED | OUTPATIENT
Start: 2018-05-02 | End: 2018-05-02

## 2018-05-02 RX ORDER — ACETAMINOPHEN 500 MG
975 TABLET ORAL ONCE
Qty: 0 | Refills: 0 | Status: COMPLETED | OUTPATIENT
Start: 2018-05-02 | End: 2018-05-02

## 2018-05-02 RX ADMIN — OXYCODONE HYDROCHLORIDE 5 MILLIGRAM(S): 5 TABLET ORAL at 04:41

## 2018-05-02 RX ADMIN — Medication 975 MILLIGRAM(S): at 04:41

## 2018-05-02 RX ADMIN — Medication 600 MILLIGRAM(S): at 04:41

## 2018-05-02 NOTE — ED PROVIDER NOTE - OBJECTIVE STATEMENT
43y Female PMh lymphoma on chemotherapy (last 2 weeks ago) complaining of rectal pain. Today, having painful rectal pain for the past 4 days. Has not taken any medications for it. Pain is the worst, usually happens when pooping, but now nonstop. No fevers, chills, or nausea/vomiting. 43y Female PMH lymphoma on chemotherapy (last 2 weeks ago) complaining of rectal pain. Today, having painful rectal pain for the past 4 days. Has not taken any medications for it. Pain is the worst, usually happens when pooping, but now nonstop. Has been using topical steroids, lidocaine cream, and compound S77 without much relief, last taken around 10pm last night. No fevers, chills, or nausea/vomiting.

## 2018-05-02 NOTE — ED ADULT NURSE NOTE - PMH
Lymphoma     (normal spontaneous vaginal delivery)  H/O  Unspecified condition associated with female genital organs and menstrual cycle

## 2018-05-02 NOTE — ED ADULT NURSE NOTE - OBJECTIVE STATEMENT
pt has PMH of lymphoma on chemo every x3 weeks. pt states, "I have a history of hemorrhoids and over the past couple of days I have been having rectal pain. I tried using the hemorrhoid cream but it didn't work" pt denies any n/v/d, abd. pain, burning/itching with urination, or any blood in the stool/urine at present. pt has PMH of lymphoma on chemo every x3 weeks. pt states, "I have a history of hemorrhoids and over the past couple of days I have been having rectal pain. I tried using the hemorrhoid cream but it didn't work" pt denies any n/v/d, abd. pain, burning/itching with urination, or any blood in the stool/urine at present. port noted to W. pt first time getting chemo was x2 weeks ago.

## 2018-05-02 NOTE — ED PROVIDER NOTE - PHYSICAL EXAMINATION
PE: CONSTITUTIONAL: Nontoxic, in no apparent distress. ENMT: Airway patent, nasal mucosa clear, mouth with normal mucosa. HEAD: NCAT EYES: PERRL, EOMI bilaterally CARDIAC: RRR, no m/r/g, no pedal edema RESPIRATORY: CTA bilaterally, no adventitious sounds GI: Abdomen non-distended, non-tender, rectum with left lower tender hemorrhoid (nonthrombosed) MSK: Spine appears normal, range of motion is not limited, no muscle/joint tenderness NEURO: CNII-XII grossly intact, 5/5 strength, full sensation all extremities, gait intact SKIN: Skin tone normal in color, warm and dry. No evidence of rash.

## 2018-05-02 NOTE — ED ADULT NURSE NOTE - CHPI ED SYMPTOMS NEG
no hematuria/no diarrhea/no burning urination/no vomiting/no chills/no fever/no abdominal distension/no blood in stool/no nausea

## 2018-05-02 NOTE — ED PROVIDER NOTE - ATTENDING CONTRIBUTION TO CARE
Attending MD Tee:  I personally have seen and examined this patient.  Resident note reviewed and agree on plan of care and except where noted.  See MDM for details.

## 2018-05-02 NOTE — ED ADULT NURSE REASSESSMENT NOTE - NS ED NURSE REASSESS COMMENT FT1
primary RN attempted to reassess pt. Upon arrival to pt room, pt/family not found to be in room with no belongings at bedside. Pt AOx3 with no IV in place. ER MD JUSTIN Liriano and ER MD MERYL Almanza made aware.

## 2018-05-02 NOTE — ED PROVIDER NOTE - MEDICAL DECISION MAKING DETAILS
43y Female PMH lymphoma on chemotherapy (last 2 weeks ago) complaining of rectal pain, likely hemorrhoidal, will provide pain meds, possible labs/imaging, reassess 43y Female PMH lymphoma on chemotherapy (last 2 weeks ago) complaining of rectal pain, likely hemorrhoidal, will provide pain meds, possible labs/imaging, reassess    Randal WHITAKER: Patient was seen by resident but left w/o being seen by me. I did not have an opportunity to interview or examine the patient.

## 2018-05-02 NOTE — ED ADULT TRIAGE NOTE - NS ED NURSE BANDS TYPE
Name band;
Diverticulitis    DVT (deep venous thrombosis)  2013 neck  Fistula of intestine    Hypertension    Obesity    Reflux    Washed out

## 2018-05-03 ENCOUNTER — OUTPATIENT (OUTPATIENT)
Dept: OUTPATIENT SERVICES | Facility: HOSPITAL | Age: 43
LOS: 1 days | Discharge: HOME | End: 2018-05-03

## 2018-05-03 DIAGNOSIS — C85.90 NON-HODGKIN LYMPHOMA, UNSPECIFIED, UNSPECIFIED SITE: ICD-10-CM

## 2018-05-03 DIAGNOSIS — Z98.890 OTHER SPECIFIED POSTPROCEDURAL STATES: Chronic | ICD-10-CM

## 2018-05-03 DIAGNOSIS — Z90.49 ACQUIRED ABSENCE OF OTHER SPECIFIED PARTS OF DIGESTIVE TRACT: Chronic | ICD-10-CM

## 2018-05-06 ENCOUNTER — FORM ENCOUNTER (OUTPATIENT)
Age: 43
End: 2018-05-06

## 2018-05-07 ENCOUNTER — LABORATORY RESULT (OUTPATIENT)
Age: 43
End: 2018-05-07

## 2018-05-07 ENCOUNTER — OUTPATIENT (OUTPATIENT)
Dept: OUTPATIENT SERVICES | Facility: HOSPITAL | Age: 43
LOS: 1 days | End: 2018-05-07
Payer: COMMERCIAL

## 2018-05-07 ENCOUNTER — APPOINTMENT (OUTPATIENT)
Dept: RADIOLOGY | Facility: HOSPITAL | Age: 43
End: 2018-05-07

## 2018-05-07 ENCOUNTER — APPOINTMENT (OUTPATIENT)
Dept: INFUSION THERAPY | Facility: HOSPITAL | Age: 43
End: 2018-05-07

## 2018-05-07 ENCOUNTER — RESULT REVIEW (OUTPATIENT)
Age: 43
End: 2018-05-07

## 2018-05-07 DIAGNOSIS — Z00.00 ENCOUNTER FOR GENERAL ADULT MEDICAL EXAMINATION WITHOUT ABNORMAL FINDINGS: ICD-10-CM

## 2018-05-07 DIAGNOSIS — C72.0 MALIGNANT NEOPLASM OF SPINAL CORD: ICD-10-CM

## 2018-05-07 DIAGNOSIS — Z98.890 OTHER SPECIFIED POSTPROCEDURAL STATES: Chronic | ICD-10-CM

## 2018-05-07 DIAGNOSIS — Z90.49 ACQUIRED ABSENCE OF OTHER SPECIFIED PARTS OF DIGESTIVE TRACT: Chronic | ICD-10-CM

## 2018-05-07 LAB
APPEARANCE CSF: CLEAR — SIGNIFICANT CHANGE UP
BASOPHILS # BLD AUTO: 0 K/UL — SIGNIFICANT CHANGE UP (ref 0–0.2)
BASOPHILS NFR BLD AUTO: 0.9 % — SIGNIFICANT CHANGE UP (ref 0–2)
COLOR CSF: SIGNIFICANT CHANGE UP
EOSINOPHIL # BLD AUTO: 0.1 K/UL — SIGNIFICANT CHANGE UP (ref 0–0.5)
EOSINOPHIL NFR BLD AUTO: 2.1 % — SIGNIFICANT CHANGE UP (ref 0–6)
GLUCOSE CSF-MCNC: 69 MG/DL — SIGNIFICANT CHANGE UP (ref 40–70)
HCT VFR BLD CALC: 37 % — SIGNIFICANT CHANGE UP (ref 34.5–45)
HGB BLD-MCNC: 12.8 G/DL — SIGNIFICANT CHANGE UP (ref 11.5–15.5)
LYMPHOCYTES # BLD AUTO: 0.9 K/UL — LOW (ref 1–3.3)
LYMPHOCYTES # BLD AUTO: 20.8 % — SIGNIFICANT CHANGE UP (ref 13–44)
MCHC RBC-ENTMCNC: 30.1 PG — SIGNIFICANT CHANGE UP (ref 27–34)
MCHC RBC-ENTMCNC: 34.7 G/DL — SIGNIFICANT CHANGE UP (ref 32–36)
MCV RBC AUTO: 86.8 FL — SIGNIFICANT CHANGE UP (ref 80–100)
MONOCYTES # BLD AUTO: 0.5 K/UL — SIGNIFICANT CHANGE UP (ref 0–0.9)
MONOCYTES NFR BLD AUTO: 10.2 % — SIGNIFICANT CHANGE UP (ref 2–14)
NEUTROPHILS # BLD AUTO: 2.9 K/UL — SIGNIFICANT CHANGE UP (ref 1.8–7.4)
NEUTROPHILS # CSF: SIGNIFICANT CHANGE UP (ref 0–6)
NEUTROPHILS NFR BLD AUTO: 66 % — SIGNIFICANT CHANGE UP (ref 43–77)
NRBC NFR CSF: <1 — SIGNIFICANT CHANGE UP (ref 0–5)
PLATELET # BLD AUTO: 324 K/UL — SIGNIFICANT CHANGE UP (ref 150–400)
PROT CSF-MCNC: 31 MG/DL — SIGNIFICANT CHANGE UP (ref 15–45)
RBC # BLD: 4.27 M/UL — SIGNIFICANT CHANGE UP (ref 3.8–5.2)
RBC # CSF: 0 /UL — SIGNIFICANT CHANGE UP (ref 0–0)
RBC # FLD: 13.4 % — SIGNIFICANT CHANGE UP (ref 10.3–14.5)
TUBE TYPE: SIGNIFICANT CHANGE UP
WBC # BLD: 4.4 K/UL — SIGNIFICANT CHANGE UP (ref 3.8–10.5)
WBC # FLD AUTO: 4.4 K/UL — SIGNIFICANT CHANGE UP (ref 3.8–10.5)

## 2018-05-07 PROCEDURE — 62272 THER SPI PNXR DRG CSF: CPT

## 2018-05-07 PROCEDURE — 89051 BODY FLUID CELL COUNT: CPT

## 2018-05-07 PROCEDURE — 77003 FLUOROGUIDE FOR SPINE INJECT: CPT

## 2018-05-07 PROCEDURE — 84157 ASSAY OF PROTEIN OTHER: CPT

## 2018-05-07 PROCEDURE — 88108 CYTOPATH CONCENTRATE TECH: CPT | Mod: 26

## 2018-05-07 PROCEDURE — 77003 FLUOROGUIDE FOR SPINE INJECT: CPT | Mod: 26

## 2018-05-07 PROCEDURE — 82945 GLUCOSE OTHER FLUID: CPT

## 2018-05-07 RX ORDER — METHOTREXATE 2.5 MG/1
12 TABLET ORAL ONCE
Qty: 0 | Refills: 0 | Status: DISCONTINUED | OUTPATIENT
Start: 2018-05-07 | End: 2018-05-22

## 2018-05-07 RX ORDER — DOCUSATE SODIUM 100 MG/1
100 CAPSULE ORAL 3 TIMES DAILY
Qty: 90 | Refills: 2 | Status: ACTIVE | COMMUNITY
Start: 2018-05-07 | End: 1900-01-01

## 2018-05-07 RX ORDER — SENNOSIDES 8.6 MG TABLETS 8.6 MG/1
8.6 TABLET ORAL DAILY
Qty: 60 | Refills: 2 | Status: ACTIVE | COMMUNITY
Start: 2018-05-07 | End: 1900-01-01

## 2018-05-08 DIAGNOSIS — Z51.11 ENCOUNTER FOR ANTINEOPLASTIC CHEMOTHERAPY: ICD-10-CM

## 2018-05-08 DIAGNOSIS — R11.2 NAUSEA WITH VOMITING, UNSPECIFIED: ICD-10-CM

## 2018-05-09 LAB — TM INTERPRETATION: SIGNIFICANT CHANGE UP

## 2018-05-14 ENCOUNTER — RESULT REVIEW (OUTPATIENT)
Age: 43
End: 2018-05-14

## 2018-05-14 ENCOUNTER — APPOINTMENT (OUTPATIENT)
Dept: HEMATOLOGY ONCOLOGY | Facility: CLINIC | Age: 43
End: 2018-05-14
Payer: COMMERCIAL

## 2018-05-14 VITALS
SYSTOLIC BLOOD PRESSURE: 109 MMHG | OXYGEN SATURATION: 99 % | DIASTOLIC BLOOD PRESSURE: 77 MMHG | RESPIRATION RATE: 16 BRPM | BODY MASS INDEX: 25.97 KG/M2 | HEART RATE: 84 BPM | WEIGHT: 146.59 LBS | TEMPERATURE: 97.4 F

## 2018-05-14 LAB
BASOPHILS # BLD AUTO: 0 K/UL — SIGNIFICANT CHANGE UP (ref 0–0.2)
BASOPHILS NFR BLD AUTO: 1 % — SIGNIFICANT CHANGE UP (ref 0–2)
EOSINOPHIL # BLD AUTO: 0.3 K/UL — SIGNIFICANT CHANGE UP (ref 0–0.5)
EOSINOPHIL NFR BLD AUTO: 6.5 % — HIGH (ref 0–6)
HCT VFR BLD CALC: 36.6 % — SIGNIFICANT CHANGE UP (ref 34.5–45)
HGB BLD-MCNC: 12.9 G/DL — SIGNIFICANT CHANGE UP (ref 11.5–15.5)
LYMPHOCYTES # BLD AUTO: 0.5 K/UL — LOW (ref 1–3.3)
LYMPHOCYTES # BLD AUTO: 12.9 % — LOW (ref 13–44)
MCHC RBC-ENTMCNC: 30.8 PG — SIGNIFICANT CHANGE UP (ref 27–34)
MCHC RBC-ENTMCNC: 35.3 G/DL — SIGNIFICANT CHANGE UP (ref 32–36)
MCV RBC AUTO: 87.4 FL — SIGNIFICANT CHANGE UP (ref 80–100)
MONOCYTES # BLD AUTO: 0 K/UL — SIGNIFICANT CHANGE UP (ref 0–0.9)
MONOCYTES NFR BLD AUTO: 0.9 % — LOW (ref 2–14)
NEUTROPHILS # BLD AUTO: 3.3 K/UL — SIGNIFICANT CHANGE UP (ref 1.8–7.4)
NEUTROPHILS NFR BLD AUTO: 78.6 % — HIGH (ref 43–77)
PLATELET # BLD AUTO: 238 K/UL — SIGNIFICANT CHANGE UP (ref 150–400)
RBC # BLD: 4.19 M/UL — SIGNIFICANT CHANGE UP (ref 3.8–5.2)
RBC # FLD: 12.7 % — SIGNIFICANT CHANGE UP (ref 10.3–14.5)
WBC # BLD: 4.2 K/UL — SIGNIFICANT CHANGE UP (ref 3.8–10.5)
WBC # FLD AUTO: 4.2 K/UL — SIGNIFICANT CHANGE UP (ref 3.8–10.5)

## 2018-05-14 PROCEDURE — 99214 OFFICE O/P EST MOD 30 MIN: CPT

## 2018-05-15 LAB
ALBUMIN SERPL ELPH-MCNC: 4.5 G/DL
ALP BLD-CCNC: 39 U/L
ALT SERPL-CCNC: 27 U/L
ANION GAP SERPL CALC-SCNC: 11 MMOL/L
AST SERPL-CCNC: 18 U/L
BILIRUB SERPL-MCNC: 0.6 MG/DL
BUN SERPL-MCNC: 9 MG/DL
CALCIUM SERPL-MCNC: 10 MG/DL
CHLORIDE SERPL-SCNC: 102 MMOL/L
CO2 SERPL-SCNC: 27 MMOL/L
CREAT SERPL-MCNC: 0.73 MG/DL
GLUCOSE SERPL-MCNC: 95 MG/DL
LDH SERPL-CCNC: 152 U/L
POTASSIUM SERPL-SCNC: 4.7 MMOL/L
PROT SERPL-MCNC: 7.4 G/DL
SODIUM SERPL-SCNC: 140 MMOL/L

## 2018-05-16 ENCOUNTER — APPOINTMENT (OUTPATIENT)
Dept: COLORECTAL SURGERY | Facility: CLINIC | Age: 43
End: 2018-05-16

## 2018-05-29 ENCOUNTER — FORM ENCOUNTER (OUTPATIENT)
Age: 43
End: 2018-05-29

## 2018-05-30 ENCOUNTER — LABORATORY RESULT (OUTPATIENT)
Age: 43
End: 2018-05-30

## 2018-05-30 ENCOUNTER — APPOINTMENT (OUTPATIENT)
Dept: RADIOLOGY | Facility: HOSPITAL | Age: 43
End: 2018-05-30

## 2018-05-30 ENCOUNTER — APPOINTMENT (OUTPATIENT)
Dept: INFUSION THERAPY | Facility: HOSPITAL | Age: 43
End: 2018-05-30

## 2018-05-30 ENCOUNTER — OUTPATIENT (OUTPATIENT)
Dept: OUTPATIENT SERVICES | Facility: HOSPITAL | Age: 43
LOS: 1 days | End: 2018-05-30
Payer: COMMERCIAL

## 2018-05-30 ENCOUNTER — RESULT REVIEW (OUTPATIENT)
Age: 43
End: 2018-05-30

## 2018-05-30 DIAGNOSIS — Z00.5 ENCOUNTER FOR EXAMINATION OF POTENTIAL DONOR OF ORGAN AND TISSUE: ICD-10-CM

## 2018-05-30 DIAGNOSIS — C72.0 MALIGNANT NEOPLASM OF SPINAL CORD: ICD-10-CM

## 2018-05-30 DIAGNOSIS — Z90.49 ACQUIRED ABSENCE OF OTHER SPECIFIED PARTS OF DIGESTIVE TRACT: Chronic | ICD-10-CM

## 2018-05-30 DIAGNOSIS — Z98.890 OTHER SPECIFIED POSTPROCEDURAL STATES: Chronic | ICD-10-CM

## 2018-05-30 LAB
APPEARANCE CSF: CLEAR — SIGNIFICANT CHANGE UP
APPEARANCE SPUN FLD: COLORLESS — SIGNIFICANT CHANGE UP
BASOPHILS # BLD AUTO: 0 K/UL — SIGNIFICANT CHANGE UP (ref 0–0.2)
BASOPHILS NFR BLD AUTO: 0.8 % — SIGNIFICANT CHANGE UP (ref 0–2)
COLOR CSF: SIGNIFICANT CHANGE UP
EOSINOPHIL # BLD AUTO: 0.2 K/UL — SIGNIFICANT CHANGE UP (ref 0–0.5)
EOSINOPHIL NFR BLD AUTO: 4.4 % — SIGNIFICANT CHANGE UP (ref 0–6)
GLUCOSE CSF-MCNC: 67 MG/DL — SIGNIFICANT CHANGE UP (ref 40–70)
GRAM STN FLD: SIGNIFICANT CHANGE UP
HCT VFR BLD CALC: 38.8 % — SIGNIFICANT CHANGE UP (ref 34.5–45)
HGB BLD-MCNC: 13.7 G/DL — SIGNIFICANT CHANGE UP (ref 11.5–15.5)
LYMPHOCYTES # BLD AUTO: 1.1 K/UL — SIGNIFICANT CHANGE UP (ref 1–3.3)
LYMPHOCYTES # BLD AUTO: 20.8 % — SIGNIFICANT CHANGE UP (ref 13–44)
LYMPHOCYTES # CSF: 80 % — SIGNIFICANT CHANGE UP (ref 40–80)
MCHC RBC-ENTMCNC: 30.6 PG — SIGNIFICANT CHANGE UP (ref 27–34)
MCHC RBC-ENTMCNC: 35.2 G/DL — SIGNIFICANT CHANGE UP (ref 32–36)
MCV RBC AUTO: 86.9 FL — SIGNIFICANT CHANGE UP (ref 80–100)
MONOCYTES # BLD AUTO: 0.6 K/UL — SIGNIFICANT CHANGE UP (ref 0–0.9)
MONOCYTES NFR BLD AUTO: 12.6 % — SIGNIFICANT CHANGE UP (ref 2–14)
MONOS+MACROS NFR CSF: 20 % — SIGNIFICANT CHANGE UP (ref 15–45)
NEUTROPHILS # BLD AUTO: 3.2 K/UL — SIGNIFICANT CHANGE UP (ref 1.8–7.4)
NEUTROPHILS # CSF: 0 % — SIGNIFICANT CHANGE UP (ref 0–6)
NEUTROPHILS NFR BLD AUTO: 61.5 % — SIGNIFICANT CHANGE UP (ref 43–77)
NRBC NFR CSF: 1 /UL — SIGNIFICANT CHANGE UP (ref 0–5)
PLATELET # BLD AUTO: 291 K/UL — SIGNIFICANT CHANGE UP (ref 150–400)
PROT CSF-MCNC: 33 MG/DL — SIGNIFICANT CHANGE UP (ref 15–45)
RBC # BLD: 4.46 M/UL — SIGNIFICANT CHANGE UP (ref 3.8–5.2)
RBC # CSF: 1 /UL — HIGH (ref 0–0)
RBC # FLD: 13.6 % — SIGNIFICANT CHANGE UP (ref 10.3–14.5)
SPECIMEN SOURCE: SIGNIFICANT CHANGE UP
TUBE TYPE: SIGNIFICANT CHANGE UP
WBC # BLD: 5.2 K/UL — SIGNIFICANT CHANGE UP (ref 3.8–10.5)
WBC # FLD AUTO: 5.2 K/UL — SIGNIFICANT CHANGE UP (ref 3.8–10.5)

## 2018-05-30 PROCEDURE — 77003 FLUOROGUIDE FOR SPINE INJECT: CPT

## 2018-05-30 PROCEDURE — 77003 FLUOROGUIDE FOR SPINE INJECT: CPT | Mod: 26

## 2018-05-30 PROCEDURE — 89051 BODY FLUID CELL COUNT: CPT

## 2018-05-30 PROCEDURE — 82945 GLUCOSE OTHER FLUID: CPT

## 2018-05-30 PROCEDURE — 84157 ASSAY OF PROTEIN OTHER: CPT

## 2018-05-30 PROCEDURE — 87205 SMEAR GRAM STAIN: CPT

## 2018-05-30 PROCEDURE — 62272 THER SPI PNXR DRG CSF: CPT

## 2018-05-30 PROCEDURE — 88108 CYTOPATH CONCENTRATE TECH: CPT | Mod: 26

## 2018-05-30 PROCEDURE — 87070 CULTURE OTHR SPECIMN AEROBIC: CPT

## 2018-05-30 RX ORDER — METHOTREXATE 2.5 MG/1
12 TABLET ORAL ONCE
Qty: 0 | Refills: 0 | Status: DISCONTINUED | OUTPATIENT
Start: 2018-05-30 | End: 2018-06-14

## 2018-05-31 ENCOUNTER — OUTPATIENT (OUTPATIENT)
Dept: OUTPATIENT SERVICES | Facility: HOSPITAL | Age: 43
LOS: 1 days | Discharge: HOME | End: 2018-05-31

## 2018-05-31 DIAGNOSIS — Z98.890 OTHER SPECIFIED POSTPROCEDURAL STATES: Chronic | ICD-10-CM

## 2018-05-31 DIAGNOSIS — C85.90 NON-HODGKIN LYMPHOMA, UNSPECIFIED, UNSPECIFIED SITE: ICD-10-CM

## 2018-05-31 DIAGNOSIS — Z90.49 ACQUIRED ABSENCE OF OTHER SPECIFIED PARTS OF DIGESTIVE TRACT: Chronic | ICD-10-CM

## 2018-06-01 LAB — TM INTERPRETATION: SIGNIFICANT CHANGE UP

## 2018-06-02 LAB
CULTURE RESULTS: NO GROWTH — SIGNIFICANT CHANGE UP
SPECIMEN SOURCE: SIGNIFICANT CHANGE UP

## 2018-06-05 ENCOUNTER — RESULT REVIEW (OUTPATIENT)
Age: 43
End: 2018-06-05

## 2018-06-05 ENCOUNTER — APPOINTMENT (OUTPATIENT)
Dept: HEMATOLOGY ONCOLOGY | Facility: CLINIC | Age: 43
End: 2018-06-05
Payer: COMMERCIAL

## 2018-06-05 VITALS
HEART RATE: 99 BPM | OXYGEN SATURATION: 100 % | RESPIRATION RATE: 16 BRPM | BODY MASS INDEX: 25.58 KG/M2 | SYSTOLIC BLOOD PRESSURE: 128 MMHG | DIASTOLIC BLOOD PRESSURE: 84 MMHG | WEIGHT: 144.4 LBS | TEMPERATURE: 97.8 F

## 2018-06-05 LAB
BASOPHILS # BLD AUTO: 0 K/UL — SIGNIFICANT CHANGE UP (ref 0–0.2)
BASOPHILS NFR BLD AUTO: 1 % — SIGNIFICANT CHANGE UP (ref 0–2)
EOSINOPHIL # BLD AUTO: 0.2 K/UL — SIGNIFICANT CHANGE UP (ref 0–0.5)
EOSINOPHIL NFR BLD AUTO: 4.4 % — SIGNIFICANT CHANGE UP (ref 0–6)
HCT VFR BLD CALC: 36.9 % — SIGNIFICANT CHANGE UP (ref 34.5–45)
HGB BLD-MCNC: 12.9 G/DL — SIGNIFICANT CHANGE UP (ref 11.5–15.5)
LYMPHOCYTES # BLD AUTO: 0.7 K/UL — LOW (ref 1–3.3)
LYMPHOCYTES # BLD AUTO: 18.2 % — SIGNIFICANT CHANGE UP (ref 13–44)
MCHC RBC-ENTMCNC: 30.2 PG — SIGNIFICANT CHANGE UP (ref 27–34)
MCHC RBC-ENTMCNC: 35 G/DL — SIGNIFICANT CHANGE UP (ref 32–36)
MCV RBC AUTO: 86.1 FL — SIGNIFICANT CHANGE UP (ref 80–100)
MONOCYTES # BLD AUTO: 0 K/UL — SIGNIFICANT CHANGE UP (ref 0–0.9)
MONOCYTES NFR BLD AUTO: 0.5 % — LOW (ref 2–14)
NEUTROPHILS # BLD AUTO: 2.7 K/UL — SIGNIFICANT CHANGE UP (ref 1.8–7.4)
NEUTROPHILS NFR BLD AUTO: 75.9 % — SIGNIFICANT CHANGE UP (ref 43–77)
PLATELET # BLD AUTO: 235 K/UL — SIGNIFICANT CHANGE UP (ref 150–400)
RBC # BLD: 4.28 M/UL — SIGNIFICANT CHANGE UP (ref 3.8–5.2)
RBC # FLD: 12.9 % — SIGNIFICANT CHANGE UP (ref 10.3–14.5)
WBC # BLD: 3.6 K/UL — LOW (ref 3.8–10.5)
WBC # FLD AUTO: 3.6 K/UL — LOW (ref 3.8–10.5)

## 2018-06-05 PROCEDURE — 99214 OFFICE O/P EST MOD 30 MIN: CPT

## 2018-06-12 ENCOUNTER — APPOINTMENT (OUTPATIENT)
Dept: ORTHOPEDIC SURGERY | Facility: CLINIC | Age: 43
End: 2018-06-12
Payer: COMMERCIAL

## 2018-06-12 PROCEDURE — 99213 OFFICE O/P EST LOW 20 MIN: CPT

## 2018-06-17 ENCOUNTER — FORM ENCOUNTER (OUTPATIENT)
Age: 43
End: 2018-06-17

## 2018-06-18 ENCOUNTER — APPOINTMENT (OUTPATIENT)
Dept: NUCLEAR MEDICINE | Facility: IMAGING CENTER | Age: 43
End: 2018-06-18
Payer: COMMERCIAL

## 2018-06-18 ENCOUNTER — APPOINTMENT (OUTPATIENT)
Dept: INFUSION THERAPY | Facility: HOSPITAL | Age: 43
End: 2018-06-18

## 2018-06-18 ENCOUNTER — OUTPATIENT (OUTPATIENT)
Dept: OUTPATIENT SERVICES | Facility: HOSPITAL | Age: 43
LOS: 1 days | End: 2018-06-18
Payer: COMMERCIAL

## 2018-06-18 DIAGNOSIS — Z98.890 OTHER SPECIFIED POSTPROCEDURAL STATES: Chronic | ICD-10-CM

## 2018-06-18 DIAGNOSIS — Z90.49 ACQUIRED ABSENCE OF OTHER SPECIFIED PARTS OF DIGESTIVE TRACT: Chronic | ICD-10-CM

## 2018-06-18 DIAGNOSIS — C83.39 DIFFUSE LARGE B-CELL LYMPHOMA, EXTRANODAL AND SOLID ORGAN SITES: ICD-10-CM

## 2018-06-18 PROCEDURE — 78816 PET IMAGE W/CT FULL BODY: CPT | Mod: 26,PS

## 2018-06-18 PROCEDURE — A9552: CPT

## 2018-06-18 PROCEDURE — 78816 PET IMAGE W/CT FULL BODY: CPT

## 2018-06-19 LAB
ALBUMIN SERPL ELPH-MCNC: 4.5 G/DL
ALP BLD-CCNC: 33 U/L
ALT SERPL-CCNC: 23 U/L
ANION GAP SERPL CALC-SCNC: 13 MMOL/L
AST SERPL-CCNC: 13 U/L
BILIRUB SERPL-MCNC: 0.5 MG/DL
BUN SERPL-MCNC: 10 MG/DL
CALCIUM SERPL-MCNC: 9.8 MG/DL
CHLORIDE SERPL-SCNC: 99 MMOL/L
CO2 SERPL-SCNC: 24 MMOL/L
CREAT SERPL-MCNC: 0.61 MG/DL
GLUCOSE SERPL-MCNC: 113 MG/DL
LDH SERPL-CCNC: 146 U/L
POTASSIUM SERPL-SCNC: 4.1 MMOL/L
PROT SERPL-MCNC: 7.3 G/DL
SODIUM SERPL-SCNC: 136 MMOL/L

## 2018-06-20 ENCOUNTER — RESULT REVIEW (OUTPATIENT)
Age: 43
End: 2018-06-20

## 2018-06-20 ENCOUNTER — LABORATORY RESULT (OUTPATIENT)
Age: 43
End: 2018-06-20

## 2018-06-20 ENCOUNTER — APPOINTMENT (OUTPATIENT)
Dept: RADIOLOGY | Facility: HOSPITAL | Age: 43
End: 2018-06-20

## 2018-06-20 ENCOUNTER — APPOINTMENT (OUTPATIENT)
Dept: INFUSION THERAPY | Facility: HOSPITAL | Age: 43
End: 2018-06-20

## 2018-06-20 LAB
BASOPHILS # BLD AUTO: 0 K/UL — SIGNIFICANT CHANGE UP (ref 0–0.2)
BASOPHILS NFR BLD AUTO: 0.5 % — SIGNIFICANT CHANGE UP (ref 0–2)
EOSINOPHIL # BLD AUTO: 0.2 K/UL — SIGNIFICANT CHANGE UP (ref 0–0.5)
EOSINOPHIL NFR BLD AUTO: 5 % — SIGNIFICANT CHANGE UP (ref 0–6)
HCT VFR BLD CALC: 34.4 % — LOW (ref 34.5–45)
HGB BLD-MCNC: 12.4 G/DL — SIGNIFICANT CHANGE UP (ref 11.5–15.5)
LYMPHOCYTES # BLD AUTO: 0.7 K/UL — LOW (ref 1–3.3)
LYMPHOCYTES # BLD AUTO: 17.9 % — SIGNIFICANT CHANGE UP (ref 13–44)
MCHC RBC-ENTMCNC: 30.6 PG — SIGNIFICANT CHANGE UP (ref 27–34)
MCHC RBC-ENTMCNC: 35.9 G/DL — SIGNIFICANT CHANGE UP (ref 32–36)
MCV RBC AUTO: 85.1 FL — SIGNIFICANT CHANGE UP (ref 80–100)
MONOCYTES # BLD AUTO: 0.6 K/UL — SIGNIFICANT CHANGE UP (ref 0–0.9)
MONOCYTES NFR BLD AUTO: 16.6 % — HIGH (ref 2–14)
NEUTROPHILS # BLD AUTO: 2.2 K/UL — SIGNIFICANT CHANGE UP (ref 1.8–7.4)
NEUTROPHILS NFR BLD AUTO: 60 % — SIGNIFICANT CHANGE UP (ref 43–77)
PLATELET # BLD AUTO: 325 K/UL — SIGNIFICANT CHANGE UP (ref 150–400)
RBC # BLD: 4.04 M/UL — SIGNIFICANT CHANGE UP (ref 3.8–5.2)
RBC # FLD: 13.2 % — SIGNIFICANT CHANGE UP (ref 10.3–14.5)
WBC # BLD: 3.7 K/UL — LOW (ref 3.8–10.5)
WBC # FLD AUTO: 3.7 K/UL — LOW (ref 3.8–10.5)

## 2018-06-21 DIAGNOSIS — Z51.11 ENCOUNTER FOR ANTINEOPLASTIC CHEMOTHERAPY: ICD-10-CM

## 2018-06-21 DIAGNOSIS — R11.2 NAUSEA WITH VOMITING, UNSPECIFIED: ICD-10-CM

## 2018-06-25 ENCOUNTER — APPOINTMENT (OUTPATIENT)
Dept: HEMATOLOGY ONCOLOGY | Facility: CLINIC | Age: 43
End: 2018-06-25
Payer: COMMERCIAL

## 2018-06-25 ENCOUNTER — APPOINTMENT (OUTPATIENT)
Dept: GYNECOLOGIC ONCOLOGY | Facility: CLINIC | Age: 43
End: 2018-06-25
Payer: COMMERCIAL

## 2018-06-25 ENCOUNTER — RESULT REVIEW (OUTPATIENT)
Age: 43
End: 2018-06-25

## 2018-06-25 VITALS
WEIGHT: 145 LBS | HEIGHT: 63 IN | OXYGEN SATURATION: 99 % | DIASTOLIC BLOOD PRESSURE: 64 MMHG | TEMPERATURE: 97.8 F | SYSTOLIC BLOOD PRESSURE: 92 MMHG | BODY MASS INDEX: 25.69 KG/M2 | HEART RATE: 82 BPM

## 2018-06-25 VITALS
BODY MASS INDEX: 26.17 KG/M2 | HEART RATE: 71 BPM | DIASTOLIC BLOOD PRESSURE: 70 MMHG | OXYGEN SATURATION: 100 % | WEIGHT: 147.71 LBS | RESPIRATION RATE: 16 BRPM | TEMPERATURE: 98.2 F | SYSTOLIC BLOOD PRESSURE: 108 MMHG

## 2018-06-25 LAB
BASOPHILS # BLD AUTO: 0 K/UL — SIGNIFICANT CHANGE UP (ref 0–0.2)
BASOPHILS NFR BLD AUTO: 0.3 % — SIGNIFICANT CHANGE UP (ref 0–2)
EOSINOPHIL # BLD AUTO: 0 K/UL — SIGNIFICANT CHANGE UP (ref 0–0.5)
EOSINOPHIL NFR BLD AUTO: 0.7 % — SIGNIFICANT CHANGE UP (ref 0–6)
HCT VFR BLD CALC: 33.5 % — LOW (ref 34.5–45)
HGB BLD-MCNC: 11.4 G/DL — LOW (ref 11.5–15.5)
LYMPHOCYTES # BLD AUTO: 0.5 K/UL — LOW (ref 1–3.3)
LYMPHOCYTES # BLD AUTO: 8.5 % — LOW (ref 13–44)
MCHC RBC-ENTMCNC: 29.2 PG — SIGNIFICANT CHANGE UP (ref 27–34)
MCHC RBC-ENTMCNC: 33.9 G/DL — SIGNIFICANT CHANGE UP (ref 32–36)
MCV RBC AUTO: 86.2 FL — SIGNIFICANT CHANGE UP (ref 80–100)
MONOCYTES # BLD AUTO: 0 K/UL — SIGNIFICANT CHANGE UP (ref 0–0.9)
MONOCYTES NFR BLD AUTO: 0.4 % — LOW (ref 2–14)
NEUTROPHILS # BLD AUTO: 4.8 K/UL — SIGNIFICANT CHANGE UP (ref 1.8–7.4)
NEUTROPHILS NFR BLD AUTO: 90.2 % — HIGH (ref 43–77)
PLATELET # BLD AUTO: 249 K/UL — SIGNIFICANT CHANGE UP (ref 150–400)
RBC # BLD: 3.89 M/UL — SIGNIFICANT CHANGE UP (ref 3.8–5.2)
RBC # FLD: 12.7 % — SIGNIFICANT CHANGE UP (ref 10.3–14.5)
WBC # BLD: 5.3 K/UL — SIGNIFICANT CHANGE UP (ref 3.8–10.5)
WBC # FLD AUTO: 5.3 K/UL — SIGNIFICANT CHANGE UP (ref 3.8–10.5)

## 2018-06-25 PROCEDURE — 99214 OFFICE O/P EST MOD 30 MIN: CPT

## 2018-06-25 PROCEDURE — 99213 OFFICE O/P EST LOW 20 MIN: CPT

## 2018-06-25 RX ORDER — ALLOPURINOL 200 MG/1
TABLET ORAL
Refills: 0 | Status: ACTIVE | COMMUNITY

## 2018-06-25 RX ORDER — OXYCODONE AND ACETAMINOPHEN 5; 325 MG/1; MG/1
5-325 TABLET ORAL
Qty: 10 | Refills: 0 | Status: ACTIVE | COMMUNITY
Start: 2018-04-03

## 2018-06-25 RX ORDER — ACYCLOVIR 400 MG/1
400 TABLET ORAL
Qty: 60 | Refills: 0 | Status: ACTIVE | COMMUNITY
Start: 2018-04-09

## 2018-06-25 RX ORDER — METOCLOPRAMIDE 10 MG/1
10 TABLET ORAL
Refills: 0 | Status: ACTIVE | COMMUNITY

## 2018-06-28 LAB
ALBUMIN SERPL ELPH-MCNC: 4.7 G/DL
ALP BLD-CCNC: 32 U/L
ALT SERPL-CCNC: 28 U/L
ANION GAP SERPL CALC-SCNC: 13 MMOL/L
AST SERPL-CCNC: 23 U/L
BILIRUB SERPL-MCNC: 0.3 MG/DL
BUN SERPL-MCNC: 13 MG/DL
CALCIUM SERPL-MCNC: 9.5 MG/DL
CHLORIDE SERPL-SCNC: 104 MMOL/L
CO2 SERPL-SCNC: 29 MMOL/L
CREAT SERPL-MCNC: 0.65 MG/DL
GLUCOSE SERPL-MCNC: 96 MG/DL
LDH SERPL-CCNC: 186 U/L
POTASSIUM SERPL-SCNC: 5.6 MMOL/L
PROT SERPL-MCNC: 7.2 G/DL
SODIUM SERPL-SCNC: 146 MMOL/L

## 2018-06-29 ENCOUNTER — OUTPATIENT (OUTPATIENT)
Dept: OUTPATIENT SERVICES | Facility: HOSPITAL | Age: 43
LOS: 1 days | Discharge: HOME | End: 2018-06-29

## 2018-06-29 DIAGNOSIS — C85.90 NON-HODGKIN LYMPHOMA, UNSPECIFIED, UNSPECIFIED SITE: ICD-10-CM

## 2018-06-29 DIAGNOSIS — Z90.49 ACQUIRED ABSENCE OF OTHER SPECIFIED PARTS OF DIGESTIVE TRACT: Chronic | ICD-10-CM

## 2018-06-29 DIAGNOSIS — Z98.890 OTHER SPECIFIED POSTPROCEDURAL STATES: Chronic | ICD-10-CM

## 2018-07-07 ENCOUNTER — APPOINTMENT (OUTPATIENT)
Dept: INFUSION THERAPY | Facility: HOSPITAL | Age: 43
End: 2018-07-07

## 2018-07-11 ENCOUNTER — LABORATORY RESULT (OUTPATIENT)
Age: 43
End: 2018-07-11

## 2018-07-11 ENCOUNTER — APPOINTMENT (OUTPATIENT)
Dept: INFUSION THERAPY | Facility: HOSPITAL | Age: 43
End: 2018-07-11

## 2018-07-11 ENCOUNTER — RESULT REVIEW (OUTPATIENT)
Age: 43
End: 2018-07-11

## 2018-07-11 LAB
EOSINOPHIL # BLD AUTO: 0.1 K/UL — SIGNIFICANT CHANGE UP (ref 0–0.5)
EOSINOPHIL NFR BLD AUTO: 2 % — SIGNIFICANT CHANGE UP (ref 0–6)
HCT VFR BLD CALC: 33.2 % — LOW (ref 34.5–45)
HGB BLD-MCNC: 11.6 G/DL — SIGNIFICANT CHANGE UP (ref 11.5–15.5)
LYMPHOCYTES # BLD AUTO: 0.7 K/UL — LOW (ref 1–3.3)
LYMPHOCYTES # BLD AUTO: 25 % — SIGNIFICANT CHANGE UP (ref 13–44)
MCHC RBC-ENTMCNC: 30.2 PG — SIGNIFICANT CHANGE UP (ref 27–34)
MCHC RBC-ENTMCNC: 34.8 G/DL — SIGNIFICANT CHANGE UP (ref 32–36)
MCV RBC AUTO: 86.9 FL — SIGNIFICANT CHANGE UP (ref 80–100)
MONOCYTES # BLD AUTO: 0.7 K/UL — SIGNIFICANT CHANGE UP (ref 0–0.9)
MONOCYTES NFR BLD AUTO: 16 % — HIGH (ref 2–14)
NEUTROPHILS # BLD AUTO: 2.1 K/UL — SIGNIFICANT CHANGE UP (ref 1.8–7.4)
NEUTROPHILS NFR BLD AUTO: 57 % — SIGNIFICANT CHANGE UP (ref 43–77)
PLAT MORPH BLD: NORMAL — SIGNIFICANT CHANGE UP
PLATELET # BLD AUTO: 264 K/UL — SIGNIFICANT CHANGE UP (ref 150–400)
RBC # BLD: 3.82 M/UL — SIGNIFICANT CHANGE UP (ref 3.8–5.2)
RBC # FLD: 13.7 % — SIGNIFICANT CHANGE UP (ref 10.3–14.5)
RBC BLD AUTO: SIGNIFICANT CHANGE UP
WBC # BLD: 3.6 K/UL — LOW (ref 3.8–10.5)
WBC # FLD AUTO: 3.6 K/UL — LOW (ref 3.8–10.5)

## 2018-07-12 DIAGNOSIS — Z51.11 ENCOUNTER FOR ANTINEOPLASTIC CHEMOTHERAPY: ICD-10-CM

## 2018-07-12 DIAGNOSIS — R11.2 NAUSEA WITH VOMITING, UNSPECIFIED: ICD-10-CM

## 2018-07-17 ENCOUNTER — APPOINTMENT (OUTPATIENT)
Dept: HEMATOLOGY ONCOLOGY | Facility: CLINIC | Age: 43
End: 2018-07-17
Payer: COMMERCIAL

## 2018-07-23 ENCOUNTER — RESULT REVIEW (OUTPATIENT)
Age: 43
End: 2018-07-23

## 2018-07-23 ENCOUNTER — OUTPATIENT (OUTPATIENT)
Dept: OUTPATIENT SERVICES | Facility: HOSPITAL | Age: 43
LOS: 1 days | Discharge: HOME | End: 2018-07-23

## 2018-07-23 ENCOUNTER — APPOINTMENT (OUTPATIENT)
Dept: HEMATOLOGY ONCOLOGY | Facility: CLINIC | Age: 43
End: 2018-07-23
Payer: COMMERCIAL

## 2018-07-23 VITALS
DIASTOLIC BLOOD PRESSURE: 78 MMHG | SYSTOLIC BLOOD PRESSURE: 114 MMHG | WEIGHT: 147.71 LBS | HEART RATE: 72 BPM | TEMPERATURE: 97.9 F | OXYGEN SATURATION: 100 % | RESPIRATION RATE: 16 BRPM | BODY MASS INDEX: 26.17 KG/M2

## 2018-07-23 DIAGNOSIS — Z98.890 OTHER SPECIFIED POSTPROCEDURAL STATES: Chronic | ICD-10-CM

## 2018-07-23 DIAGNOSIS — Z90.49 ACQUIRED ABSENCE OF OTHER SPECIFIED PARTS OF DIGESTIVE TRACT: Chronic | ICD-10-CM

## 2018-07-23 DIAGNOSIS — C85.90 NON-HODGKIN LYMPHOMA, UNSPECIFIED, UNSPECIFIED SITE: ICD-10-CM

## 2018-07-23 LAB
BASOPHILS # BLD AUTO: 0 K/UL — SIGNIFICANT CHANGE UP (ref 0–0.2)
EOSINOPHIL # BLD AUTO: 0 K/UL — SIGNIFICANT CHANGE UP (ref 0–0.5)
EOSINOPHIL NFR BLD AUTO: 2 % — SIGNIFICANT CHANGE UP (ref 0–6)
HCT VFR BLD CALC: 32.3 % — LOW (ref 34.5–45)
HGB BLD-MCNC: 11.1 G/DL — LOW (ref 11.5–15.5)
LYMPHOCYTES # BLD AUTO: 0.4 K/UL — LOW (ref 1–3.3)
LYMPHOCYTES # BLD AUTO: 38 % — SIGNIFICANT CHANGE UP (ref 13–44)
MCHC RBC-ENTMCNC: 30.1 PG — SIGNIFICANT CHANGE UP (ref 27–34)
MCHC RBC-ENTMCNC: 34.4 G/DL — SIGNIFICANT CHANGE UP (ref 32–36)
MCV RBC AUTO: 87.6 FL — SIGNIFICANT CHANGE UP (ref 80–100)
MONOCYTES # BLD AUTO: 0.2 K/UL — SIGNIFICANT CHANGE UP (ref 0–0.9)
MONOCYTES NFR BLD AUTO: 18 % — HIGH (ref 2–14)
NEUTROPHILS # BLD AUTO: 0.3 K/UL — LOW (ref 1.8–7.4)
NEUTROPHILS NFR BLD AUTO: 40 % — LOW (ref 43–77)
NEUTS BAND # BLD: 2 % — SIGNIFICANT CHANGE UP (ref 0–8)
PLAT MORPH BLD: NORMAL — SIGNIFICANT CHANGE UP
PLATELET # BLD AUTO: 156 K/UL — SIGNIFICANT CHANGE UP (ref 150–400)
RBC # BLD: 3.69 M/UL — LOW (ref 3.8–5.2)
RBC # FLD: 14.1 % — SIGNIFICANT CHANGE UP (ref 10.3–14.5)
RBC BLD AUTO: SIGNIFICANT CHANGE UP
WBC # BLD: 0.9 K/UL — CRITICAL LOW (ref 3.8–10.5)
WBC # FLD AUTO: 0.9 K/UL — CRITICAL LOW (ref 3.8–10.5)

## 2018-07-23 PROCEDURE — 99214 OFFICE O/P EST MOD 30 MIN: CPT

## 2018-07-23 RX ORDER — APREPITANT 80 MG/1
80 CAPSULE ORAL
Qty: 2 | Refills: 0 | Status: ACTIVE | COMMUNITY
Start: 2018-04-23 | End: 1900-01-01

## 2018-07-24 PROBLEM — N94.9 UNSPECIFIED CONDITION ASSOCIATED WITH FEMALE GENITAL ORGANS AND MENSTRUAL CYCLE: Chronic | Status: ACTIVE | Noted: 2018-03-30

## 2018-07-24 PROBLEM — C85.90 NON-HODGKIN LYMPHOMA, UNSPECIFIED, UNSPECIFIED SITE: Chronic | Status: ACTIVE | Noted: 2018-03-30

## 2018-07-25 LAB
ALBUMIN SERPL ELPH-MCNC: 5 G/DL
ALP BLD-CCNC: 35 U/L
ALT SERPL-CCNC: 14 U/L
ANION GAP SERPL CALC-SCNC: 15 MMOL/L
AST SERPL-CCNC: 17 U/L
BILIRUB SERPL-MCNC: 0.4 MG/DL
BUN SERPL-MCNC: 6 MG/DL
CALCIUM SERPL-MCNC: 9.9 MG/DL
CHLORIDE SERPL-SCNC: 104 MMOL/L
CO2 SERPL-SCNC: 24 MMOL/L
CREAT SERPL-MCNC: 0.51 MG/DL
GLUCOSE SERPL-MCNC: 89 MG/DL
LDH SERPL-CCNC: 171 U/L
POTASSIUM SERPL-SCNC: 4.2 MMOL/L
PROT SERPL-MCNC: 7.8 G/DL
SODIUM SERPL-SCNC: 143 MMOL/L

## 2018-07-30 ENCOUNTER — APPOINTMENT (OUTPATIENT)
Dept: HEMATOLOGY ONCOLOGY | Facility: CLINIC | Age: 43
End: 2018-07-30
Payer: COMMERCIAL

## 2018-07-31 ENCOUNTER — FORM ENCOUNTER (OUTPATIENT)
Age: 43
End: 2018-07-31

## 2018-08-01 ENCOUNTER — RESULT REVIEW (OUTPATIENT)
Age: 43
End: 2018-08-01

## 2018-08-01 ENCOUNTER — APPOINTMENT (OUTPATIENT)
Dept: RADIOLOGY | Facility: HOSPITAL | Age: 43
End: 2018-08-01
Payer: COMMERCIAL

## 2018-08-01 ENCOUNTER — APPOINTMENT (OUTPATIENT)
Dept: INFUSION THERAPY | Facility: HOSPITAL | Age: 43
End: 2018-08-01

## 2018-08-01 ENCOUNTER — LABORATORY RESULT (OUTPATIENT)
Age: 43
End: 2018-08-01

## 2018-08-01 ENCOUNTER — OUTPATIENT (OUTPATIENT)
Dept: OUTPATIENT SERVICES | Facility: HOSPITAL | Age: 43
LOS: 1 days | End: 2018-08-01
Payer: COMMERCIAL

## 2018-08-01 DIAGNOSIS — Z90.49 ACQUIRED ABSENCE OF OTHER SPECIFIED PARTS OF DIGESTIVE TRACT: Chronic | ICD-10-CM

## 2018-08-01 DIAGNOSIS — C83.39 DIFFUSE LARGE B-CELL LYMPHOMA, EXTRANODAL AND SOLID ORGAN SITES: ICD-10-CM

## 2018-08-01 DIAGNOSIS — Z98.890 OTHER SPECIFIED POSTPROCEDURAL STATES: Chronic | ICD-10-CM

## 2018-08-01 DIAGNOSIS — C72.0 MALIGNANT NEOPLASM OF SPINAL CORD: ICD-10-CM

## 2018-08-01 DIAGNOSIS — Z00.8 ENCOUNTER FOR OTHER GENERAL EXAMINATION: ICD-10-CM

## 2018-08-01 LAB
APPEARANCE CSF: CLEAR — SIGNIFICANT CHANGE UP
APPEARANCE SPUN FLD: COLORLESS — SIGNIFICANT CHANGE UP
BASOPHILS # BLD AUTO: 0 K/UL — SIGNIFICANT CHANGE UP (ref 0–0.2)
BASOPHILS NFR BLD AUTO: 1 % — SIGNIFICANT CHANGE UP (ref 0–2)
COLOR CSF: SIGNIFICANT CHANGE UP
EOSINOPHIL # BLD AUTO: 0 K/UL — SIGNIFICANT CHANGE UP (ref 0–0.5)
EOSINOPHIL # CSF: 3 % — SIGNIFICANT CHANGE UP
EOSINOPHIL NFR BLD AUTO: 1 % — SIGNIFICANT CHANGE UP (ref 0–6)
GLUCOSE CSF-MCNC: 62 MG/DL — SIGNIFICANT CHANGE UP (ref 40–70)
GRAM STN FLD: SIGNIFICANT CHANGE UP
HCT VFR BLD CALC: 34.6 % — SIGNIFICANT CHANGE UP (ref 34.5–45)
HGB BLD-MCNC: 11.8 G/DL — SIGNIFICANT CHANGE UP (ref 11.5–15.5)
LYMPHOCYTES # BLD AUTO: 0.6 K/UL — LOW (ref 1–3.3)
LYMPHOCYTES # BLD AUTO: 25 % — SIGNIFICANT CHANGE UP (ref 13–44)
LYMPHOCYTES # CSF: 52 % — SIGNIFICANT CHANGE UP (ref 40–80)
MCHC RBC-ENTMCNC: 29.8 PG — SIGNIFICANT CHANGE UP (ref 27–34)
MCHC RBC-ENTMCNC: 34.1 G/DL — SIGNIFICANT CHANGE UP (ref 32–36)
MCV RBC AUTO: 87.3 FL — SIGNIFICANT CHANGE UP (ref 80–100)
MONOCYTES # BLD AUTO: 0.5 K/UL — SIGNIFICANT CHANGE UP (ref 0–0.9)
MONOCYTES NFR BLD AUTO: 23 % — HIGH (ref 2–14)
MONOS+MACROS NFR CSF: 36 % — SIGNIFICANT CHANGE UP (ref 15–45)
NEUTROPHILS # BLD AUTO: 1.2 K/UL — LOW (ref 1.8–7.4)
NEUTROPHILS # CSF: 9 % — HIGH (ref 0–6)
NEUTROPHILS NFR BLD AUTO: 50 % — SIGNIFICANT CHANGE UP (ref 43–77)
NRBC NFR CSF: 1 /UL — SIGNIFICANT CHANGE UP (ref 0–5)
PLAT MORPH BLD: NORMAL — SIGNIFICANT CHANGE UP
PLATELET # BLD AUTO: 302 K/UL — SIGNIFICANT CHANGE UP (ref 150–400)
PROT CSF-MCNC: 41 MG/DL — SIGNIFICANT CHANGE UP (ref 15–45)
RBC # BLD: 3.96 M/UL — SIGNIFICANT CHANGE UP (ref 3.8–5.2)
RBC # CSF: 270 /UL — HIGH (ref 0–0)
RBC # FLD: 14.1 % — SIGNIFICANT CHANGE UP (ref 10.3–14.5)
RBC BLD AUTO: SIGNIFICANT CHANGE UP
SPECIMEN SOURCE: SIGNIFICANT CHANGE UP
TUBE TYPE: SIGNIFICANT CHANGE UP
WBC # BLD: 2.3 K/UL — LOW (ref 3.8–10.5)
WBC # FLD AUTO: 2.3 K/UL — LOW (ref 3.8–10.5)

## 2018-08-01 PROCEDURE — 89051 BODY FLUID CELL COUNT: CPT

## 2018-08-01 PROCEDURE — 62272 THER SPI PNXR DRG CSF: CPT

## 2018-08-01 PROCEDURE — 84157 ASSAY OF PROTEIN OTHER: CPT

## 2018-08-01 PROCEDURE — 77003 FLUOROGUIDE FOR SPINE INJECT: CPT

## 2018-08-01 PROCEDURE — 82945 GLUCOSE OTHER FLUID: CPT

## 2018-08-01 PROCEDURE — 88108 CYTOPATH CONCENTRATE TECH: CPT | Mod: 26

## 2018-08-01 PROCEDURE — 77003 FLUOROGUIDE FOR SPINE INJECT: CPT | Mod: 26

## 2018-08-01 PROCEDURE — 87205 SMEAR GRAM STAIN: CPT

## 2018-08-01 PROCEDURE — 87070 CULTURE OTHR SPECIMN AEROBIC: CPT

## 2018-08-01 RX ORDER — METHOTREXATE 2.5 MG/1
12 TABLET ORAL ONCE
Qty: 0 | Refills: 0 | Status: DISCONTINUED | OUTPATIENT
Start: 2018-08-01 | End: 2018-08-16

## 2018-08-02 DIAGNOSIS — R11.2 NAUSEA WITH VOMITING, UNSPECIFIED: ICD-10-CM

## 2018-08-02 DIAGNOSIS — Z51.11 ENCOUNTER FOR ANTINEOPLASTIC CHEMOTHERAPY: ICD-10-CM

## 2018-08-04 LAB
CULTURE RESULTS: NO GROWTH — SIGNIFICANT CHANGE UP
SPECIMEN SOURCE: SIGNIFICANT CHANGE UP

## 2018-08-06 LAB — TM INTERPRETATION: SIGNIFICANT CHANGE UP

## 2018-08-13 ENCOUNTER — RESULT REVIEW (OUTPATIENT)
Age: 43
End: 2018-08-13

## 2018-08-13 ENCOUNTER — APPOINTMENT (OUTPATIENT)
Dept: HEMATOLOGY ONCOLOGY | Facility: CLINIC | Age: 43
End: 2018-08-13
Payer: COMMERCIAL

## 2018-08-13 VITALS
HEART RATE: 83 BPM | SYSTOLIC BLOOD PRESSURE: 111 MMHG | OXYGEN SATURATION: 100 % | WEIGHT: 148.81 LBS | BODY MASS INDEX: 26.36 KG/M2 | RESPIRATION RATE: 16 BRPM | TEMPERATURE: 98.5 F | DIASTOLIC BLOOD PRESSURE: 69 MMHG

## 2018-08-13 LAB
BASOPHILS # BLD AUTO: 0 K/UL — SIGNIFICANT CHANGE UP (ref 0–0.2)
EOSINOPHIL # BLD AUTO: 0 K/UL — SIGNIFICANT CHANGE UP (ref 0–0.5)
EOSINOPHIL NFR BLD AUTO: 4 % — SIGNIFICANT CHANGE UP (ref 0–6)
HCT VFR BLD CALC: 27.1 % — LOW (ref 34.5–45)
HGB BLD-MCNC: 9.6 G/DL — LOW (ref 11.5–15.5)
LYMPHOCYTES # BLD AUTO: 0.3 K/UL — LOW (ref 1–3.3)
LYMPHOCYTES # BLD AUTO: 44 % — SIGNIFICANT CHANGE UP (ref 13–44)
MCHC RBC-ENTMCNC: 31.2 PG — SIGNIFICANT CHANGE UP (ref 27–34)
MCHC RBC-ENTMCNC: 35.5 G/DL — SIGNIFICANT CHANGE UP (ref 32–36)
MCV RBC AUTO: 88 FL — SIGNIFICANT CHANGE UP (ref 80–100)
MONOCYTES # BLD AUTO: 0.2 K/UL — SIGNIFICANT CHANGE UP (ref 0–0.9)
MONOCYTES NFR BLD AUTO: 24 % — HIGH (ref 2–14)
NEUTROPHILS # BLD AUTO: 0.3 K/UL — LOW (ref 1.8–7.4)
NEUTROPHILS NFR BLD AUTO: 28 % — LOW (ref 43–77)
PLAT MORPH BLD: NORMAL — SIGNIFICANT CHANGE UP
PLATELET # BLD AUTO: 124 K/UL — LOW (ref 150–400)
RBC # BLD: 3.08 M/UL — LOW (ref 3.8–5.2)
RBC # FLD: 14.2 % — SIGNIFICANT CHANGE UP (ref 10.3–14.5)
RBC BLD AUTO: SIGNIFICANT CHANGE UP
WBC # BLD: 0.8 K/UL — CRITICAL LOW (ref 3.8–10.5)
WBC # FLD AUTO: 0.8 K/UL — CRITICAL LOW (ref 3.8–10.5)

## 2018-08-13 PROCEDURE — 99214 OFFICE O/P EST MOD 30 MIN: CPT

## 2018-08-14 LAB
ALBUMIN SERPL ELPH-MCNC: 4.6 G/DL
ALP BLD-CCNC: 30 U/L
ALT SERPL-CCNC: 12 U/L
ANION GAP SERPL CALC-SCNC: 12 MMOL/L
AST SERPL-CCNC: 16 U/L
BILIRUB SERPL-MCNC: 0.2 MG/DL
BUN SERPL-MCNC: 6 MG/DL
CALCIUM SERPL-MCNC: 9.6 MG/DL
CHLORIDE SERPL-SCNC: 103 MMOL/L
CO2 SERPL-SCNC: 26 MMOL/L
CREAT SERPL-MCNC: 0.58 MG/DL
GLUCOSE SERPL-MCNC: 87 MG/DL
LDH SERPL-CCNC: 151 U/L
POTASSIUM SERPL-SCNC: 4.5 MMOL/L
PROT SERPL-MCNC: 6.7 G/DL
SODIUM SERPL-SCNC: 141 MMOL/L

## 2018-08-30 ENCOUNTER — APPOINTMENT (OUTPATIENT)
Dept: OBGYN | Facility: CLINIC | Age: 43
End: 2018-08-30

## 2018-09-07 ENCOUNTER — OUTPATIENT (OUTPATIENT)
Dept: OUTPATIENT SERVICES | Facility: HOSPITAL | Age: 43
LOS: 1 days | Discharge: HOME | End: 2018-09-07

## 2018-09-07 DIAGNOSIS — C85.90 NON-HODGKIN LYMPHOMA, UNSPECIFIED, UNSPECIFIED SITE: ICD-10-CM

## 2018-09-07 DIAGNOSIS — Z98.890 OTHER SPECIFIED POSTPROCEDURAL STATES: Chronic | ICD-10-CM

## 2018-09-07 DIAGNOSIS — Z90.49 ACQUIRED ABSENCE OF OTHER SPECIFIED PARTS OF DIGESTIVE TRACT: Chronic | ICD-10-CM

## 2018-09-11 ENCOUNTER — FORM ENCOUNTER (OUTPATIENT)
Age: 43
End: 2018-09-11

## 2018-09-12 ENCOUNTER — APPOINTMENT (OUTPATIENT)
Dept: NUCLEAR MEDICINE | Facility: IMAGING CENTER | Age: 43
End: 2018-09-12
Payer: COMMERCIAL

## 2018-09-12 ENCOUNTER — OUTPATIENT (OUTPATIENT)
Dept: OUTPATIENT SERVICES | Facility: HOSPITAL | Age: 43
LOS: 1 days | End: 2018-09-12
Payer: COMMERCIAL

## 2018-09-12 DIAGNOSIS — Z98.890 OTHER SPECIFIED POSTPROCEDURAL STATES: Chronic | ICD-10-CM

## 2018-09-12 DIAGNOSIS — Z90.49 ACQUIRED ABSENCE OF OTHER SPECIFIED PARTS OF DIGESTIVE TRACT: Chronic | ICD-10-CM

## 2018-09-12 DIAGNOSIS — C83.39 DIFFUSE LARGE B-CELL LYMPHOMA, EXTRANODAL AND SOLID ORGAN SITES: ICD-10-CM

## 2018-09-12 PROCEDURE — A9552: CPT

## 2018-09-12 PROCEDURE — 78816 PET IMAGE W/CT FULL BODY: CPT

## 2018-09-12 PROCEDURE — 78816 PET IMAGE W/CT FULL BODY: CPT | Mod: 26,PS

## 2018-09-14 ENCOUNTER — LABORATORY RESULT (OUTPATIENT)
Age: 43
End: 2018-09-14

## 2018-09-14 ENCOUNTER — RESULT REVIEW (OUTPATIENT)
Age: 43
End: 2018-09-14

## 2018-09-14 ENCOUNTER — APPOINTMENT (OUTPATIENT)
Dept: HEMATOLOGY ONCOLOGY | Facility: CLINIC | Age: 43
End: 2018-09-14
Payer: COMMERCIAL

## 2018-09-14 ENCOUNTER — APPOINTMENT (OUTPATIENT)
Dept: INFUSION THERAPY | Facility: HOSPITAL | Age: 43
End: 2018-09-14

## 2018-09-14 VITALS
TEMPERATURE: 98 F | BODY MASS INDEX: 26.56 KG/M2 | DIASTOLIC BLOOD PRESSURE: 70 MMHG | RESPIRATION RATE: 16 BRPM | OXYGEN SATURATION: 100 % | HEART RATE: 74 BPM | WEIGHT: 149.91 LBS | SYSTOLIC BLOOD PRESSURE: 120 MMHG

## 2018-09-14 LAB
BASOPHILS # BLD AUTO: 0 K/UL — SIGNIFICANT CHANGE UP (ref 0–0.2)
BASOPHILS NFR BLD AUTO: 1 % — SIGNIFICANT CHANGE UP (ref 0–2)
EOSINOPHIL # BLD AUTO: 0.1 K/UL — SIGNIFICANT CHANGE UP (ref 0–0.5)
EOSINOPHIL NFR BLD AUTO: 3.2 % — SIGNIFICANT CHANGE UP (ref 0–6)
HCT VFR BLD CALC: 30.9 % — LOW (ref 34.5–45)
HGB BLD-MCNC: 10.8 G/DL — LOW (ref 11.5–15.5)
LYMPHOCYTES # BLD AUTO: 0.8 K/UL — LOW (ref 1–3.3)
LYMPHOCYTES # BLD AUTO: 27.8 % — SIGNIFICANT CHANGE UP (ref 13–44)
MCHC RBC-ENTMCNC: 30.6 PG — SIGNIFICANT CHANGE UP (ref 27–34)
MCHC RBC-ENTMCNC: 34.8 G/DL — SIGNIFICANT CHANGE UP (ref 32–36)
MCV RBC AUTO: 87.8 FL — SIGNIFICANT CHANGE UP (ref 80–100)
MONOCYTES # BLD AUTO: 0.4 K/UL — SIGNIFICANT CHANGE UP (ref 0–0.9)
MONOCYTES NFR BLD AUTO: 13.2 % — SIGNIFICANT CHANGE UP (ref 2–14)
NEUTROPHILS # BLD AUTO: 1.5 K/UL — LOW (ref 1.8–7.4)
NEUTROPHILS NFR BLD AUTO: 54.7 % — SIGNIFICANT CHANGE UP (ref 43–77)
PLATELET # BLD AUTO: 200 K/UL — SIGNIFICANT CHANGE UP (ref 150–400)
RBC # BLD: 3.52 M/UL — LOW (ref 3.8–5.2)
RBC # FLD: 12.9 % — SIGNIFICANT CHANGE UP (ref 10.3–14.5)
WBC # BLD: 2.8 K/UL — LOW (ref 3.8–10.5)
WBC # FLD AUTO: 2.8 K/UL — LOW (ref 3.8–10.5)

## 2018-09-14 PROCEDURE — 99214 OFFICE O/P EST MOD 30 MIN: CPT

## 2018-09-18 ENCOUNTER — APPOINTMENT (OUTPATIENT)
Dept: INTERVENTIONAL RADIOLOGY/VASCULAR | Facility: CLINIC | Age: 43
End: 2018-09-18
Payer: COMMERCIAL

## 2018-09-18 ENCOUNTER — APPOINTMENT (OUTPATIENT)
Dept: ORTHOPEDIC SURGERY | Facility: CLINIC | Age: 43
End: 2018-09-18
Payer: COMMERCIAL

## 2018-09-18 VITALS
WEIGHT: 148 LBS | DIASTOLIC BLOOD PRESSURE: 81 MMHG | SYSTOLIC BLOOD PRESSURE: 114 MMHG | OXYGEN SATURATION: 99 % | BODY MASS INDEX: 26.22 KG/M2 | RESPIRATION RATE: 16 BRPM | HEIGHT: 63 IN | HEART RATE: 67 BPM

## 2018-09-18 DIAGNOSIS — Z01.818 ENCOUNTER FOR OTHER PREPROCEDURAL EXAMINATION: ICD-10-CM

## 2018-09-18 PROCEDURE — 99203 OFFICE O/P NEW LOW 30 MIN: CPT

## 2018-09-18 PROCEDURE — 99213 OFFICE O/P EST LOW 20 MIN: CPT

## 2018-09-25 LAB
APTT BLD: 37 SEC
INR PPP: 0.92 RATIO
PT BLD: 10.4 SEC

## 2018-09-27 NOTE — ASU PREOP CHECKLIST - SURGICAL CONSENT
Island Pedicle Flap With Canthal Suspension Text: The defect edges were debeveled with a #15 scalpel blade.  Given the location of the defect, shape of the defect and the proximity to free margins an island pedicle advancement flap was deemed most appropriate.  Using a sterile surgical marker, an appropriate advancement flap was drawn incorporating the defect, outlining the appropriate donor tissue and placing the expected incisions within the relaxed skin tension lines where possible. The area thus outlined was incised deep to adipose tissue with a #15 scalpel blade.  The skin margins were undermined to an appropriate distance in all directions around the primary defect and laterally outward around the island pedicle utilizing iris scissors.  There was minimal undermining beneath the pedicle flap. A suspension suture was placed in the canthal tendon to prevent tension and prevent ectropion. done

## 2018-10-07 ENCOUNTER — FORM ENCOUNTER (OUTPATIENT)
Age: 43
End: 2018-10-07

## 2018-10-08 ENCOUNTER — OUTPATIENT (OUTPATIENT)
Dept: OUTPATIENT SERVICES | Facility: HOSPITAL | Age: 43
LOS: 1 days | End: 2018-10-08
Payer: COMMERCIAL

## 2018-10-08 ENCOUNTER — RESULT REVIEW (OUTPATIENT)
Age: 43
End: 2018-10-08

## 2018-10-08 DIAGNOSIS — Z90.49 ACQUIRED ABSENCE OF OTHER SPECIFIED PARTS OF DIGESTIVE TRACT: Chronic | ICD-10-CM

## 2018-10-08 DIAGNOSIS — C85.90 NON-HODGKIN LYMPHOMA, UNSPECIFIED, UNSPECIFIED SITE: ICD-10-CM

## 2018-10-08 DIAGNOSIS — C83.39 DIFFUSE LARGE B-CELL LYMPHOMA, EXTRANODAL AND SOLID ORGAN SITES: ICD-10-CM

## 2018-10-08 DIAGNOSIS — Z98.890 OTHER SPECIFIED POSTPROCEDURAL STATES: Chronic | ICD-10-CM

## 2018-10-08 PROCEDURE — 20225 BONE BIOPSY TROCAR/NDL DEEP: CPT

## 2018-10-08 PROCEDURE — 88341 IMHCHEM/IMCYTCHM EA ADD ANTB: CPT | Mod: 26

## 2018-10-08 PROCEDURE — 77012 CT SCAN FOR NEEDLE BIOPSY: CPT | Mod: 26

## 2018-10-08 PROCEDURE — 20220 BONE BIOPSY TROCAR/NDL SUPFC: CPT | Mod: 59

## 2018-10-08 PROCEDURE — 88342 IMHCHEM/IMCYTCHM 1ST ANTB: CPT | Mod: 26

## 2018-10-08 PROCEDURE — 88305 TISSUE EXAM BY PATHOLOGIST: CPT | Mod: 26

## 2018-10-08 PROCEDURE — 88173 CYTOPATH EVAL FNA REPORT: CPT | Mod: 26

## 2018-10-10 LAB — NON-GYNECOLOGICAL CYTOLOGY STUDY: SIGNIFICANT CHANGE UP

## 2018-10-11 LAB — NON-GYNECOLOGICAL CYTOLOGY STUDY: SIGNIFICANT CHANGE UP

## 2018-10-13 DIAGNOSIS — Z85.72 PERSONAL HISTORY OF NON-HODGKIN LYMPHOMAS: ICD-10-CM

## 2018-10-13 DIAGNOSIS — M89.9 DISORDER OF BONE, UNSPECIFIED: ICD-10-CM

## 2018-10-16 ENCOUNTER — APPOINTMENT (OUTPATIENT)
Dept: ORTHOPEDIC SURGERY | Facility: CLINIC | Age: 43
End: 2018-10-16
Payer: COMMERCIAL

## 2018-10-16 PROCEDURE — 99213 OFFICE O/P EST LOW 20 MIN: CPT

## 2018-10-22 ENCOUNTER — OUTPATIENT (OUTPATIENT)
Dept: OUTPATIENT SERVICES | Facility: HOSPITAL | Age: 43
LOS: 1 days | Discharge: HOME | End: 2018-10-22

## 2018-10-22 DIAGNOSIS — Z98.890 OTHER SPECIFIED POSTPROCEDURAL STATES: Chronic | ICD-10-CM

## 2018-10-22 DIAGNOSIS — Z90.49 ACQUIRED ABSENCE OF OTHER SPECIFIED PARTS OF DIGESTIVE TRACT: Chronic | ICD-10-CM

## 2018-10-22 DIAGNOSIS — C85.90 NON-HODGKIN LYMPHOMA, UNSPECIFIED, UNSPECIFIED SITE: ICD-10-CM

## 2018-10-29 ENCOUNTER — APPOINTMENT (OUTPATIENT)
Dept: INFUSION THERAPY | Facility: HOSPITAL | Age: 43
End: 2018-10-29

## 2018-12-03 ENCOUNTER — OUTPATIENT (OUTPATIENT)
Dept: OUTPATIENT SERVICES | Facility: HOSPITAL | Age: 43
LOS: 1 days | Discharge: HOME | End: 2018-12-03

## 2018-12-03 DIAGNOSIS — C85.90 NON-HODGKIN LYMPHOMA, UNSPECIFIED, UNSPECIFIED SITE: ICD-10-CM

## 2018-12-03 DIAGNOSIS — Z98.890 OTHER SPECIFIED POSTPROCEDURAL STATES: Chronic | ICD-10-CM

## 2018-12-03 DIAGNOSIS — Z90.49 ACQUIRED ABSENCE OF OTHER SPECIFIED PARTS OF DIGESTIVE TRACT: Chronic | ICD-10-CM

## 2018-12-10 ENCOUNTER — RESULT REVIEW (OUTPATIENT)
Age: 43
End: 2018-12-10

## 2018-12-10 ENCOUNTER — LABORATORY RESULT (OUTPATIENT)
Age: 43
End: 2018-12-10

## 2018-12-10 ENCOUNTER — APPOINTMENT (OUTPATIENT)
Dept: HEMATOLOGY ONCOLOGY | Facility: CLINIC | Age: 43
End: 2018-12-10
Payer: COMMERCIAL

## 2018-12-10 ENCOUNTER — APPOINTMENT (OUTPATIENT)
Dept: INFUSION THERAPY | Facility: HOSPITAL | Age: 43
End: 2018-12-10

## 2018-12-10 VITALS
HEART RATE: 75 BPM | WEIGHT: 147.71 LBS | DIASTOLIC BLOOD PRESSURE: 73 MMHG | RESPIRATION RATE: 16 BRPM | OXYGEN SATURATION: 100 % | SYSTOLIC BLOOD PRESSURE: 109 MMHG | BODY MASS INDEX: 26.17 KG/M2 | TEMPERATURE: 97.9 F

## 2018-12-10 LAB
BASOPHILS # BLD AUTO: 0 K/UL — SIGNIFICANT CHANGE UP (ref 0–0.2)
BASOPHILS NFR BLD AUTO: 0.6 % — SIGNIFICANT CHANGE UP (ref 0–2)
EOSINOPHIL # BLD AUTO: 0.1 K/UL — SIGNIFICANT CHANGE UP (ref 0–0.5)
EOSINOPHIL NFR BLD AUTO: 2.1 % — SIGNIFICANT CHANGE UP (ref 0–6)
HCT VFR BLD CALC: 36.2 % — SIGNIFICANT CHANGE UP (ref 34.5–45)
HGB BLD-MCNC: 12.6 G/DL — SIGNIFICANT CHANGE UP (ref 11.5–15.5)
LYMPHOCYTES # BLD AUTO: 0.8 K/UL — LOW (ref 1–3.3)
LYMPHOCYTES # BLD AUTO: 31.7 % — SIGNIFICANT CHANGE UP (ref 13–44)
MCHC RBC-ENTMCNC: 29.4 PG — SIGNIFICANT CHANGE UP (ref 27–34)
MCHC RBC-ENTMCNC: 34.7 G/DL — SIGNIFICANT CHANGE UP (ref 32–36)
MCV RBC AUTO: 84.7 FL — SIGNIFICANT CHANGE UP (ref 80–100)
MONOCYTES # BLD AUTO: 0.3 K/UL — SIGNIFICANT CHANGE UP (ref 0–0.9)
MONOCYTES NFR BLD AUTO: 12.8 % — SIGNIFICANT CHANGE UP (ref 2–14)
NEUTROPHILS # BLD AUTO: 1.4 K/UL — LOW (ref 1.8–7.4)
NEUTROPHILS NFR BLD AUTO: 52.8 % — SIGNIFICANT CHANGE UP (ref 43–77)
PLATELET # BLD AUTO: 201 K/UL — SIGNIFICANT CHANGE UP (ref 150–400)
RBC # BLD: 4.27 M/UL — SIGNIFICANT CHANGE UP (ref 3.8–5.2)
RBC # FLD: 12.3 % — SIGNIFICANT CHANGE UP (ref 10.3–14.5)
WBC # BLD: 2.7 K/UL — LOW (ref 3.8–10.5)
WBC # FLD AUTO: 2.7 K/UL — LOW (ref 3.8–10.5)

## 2018-12-10 PROCEDURE — 99213 OFFICE O/P EST LOW 20 MIN: CPT

## 2018-12-10 NOTE — REVIEW OF SYSTEMS
[Joint Pain] : joint pain [Negative] : Genitourinary [FreeTextEntry8] : no menses [FreeTextEntry9] : improved [de-identified] : headache

## 2018-12-10 NOTE — HISTORY OF PRESENT ILLNESS
[de-identified] : 42yo F here for management of newly diagnosed DLBCL. \par \par Pt developed pain in Right knee in 2/2017 as well as some heaviness, swelling, and difficulty walking secondary to pain. Pt had imaging done including an Xray on 4/3/17 which was unremarkable, MRI of the Right knee on 5/30/17 showing nonaggressive appearing enhancing foci within the distal femur and proximal tibia, and a bone scan on 6/6/17 which showed no osteoblastic reactive pathology with focal uptake in the left lateral tibial plateau. Pain resolved spontaneously within a few weeks. \par \par In 2/2018, she had recurrent pain in Right knee. Xray done of right femur on 3/12/18 showed lucent lesion in the distal femur. This was followed by an MRI on 3/8/18 showing a large destructive bone lesion in the dorsum of the distal right femoral metaphysis that is suspicious for malignancy. Had PET done 3/25/18 which showed hypermetabolic lytic lesion right distal femur (SUV max of 13), slight uptake lateral condyle left femur, subcentimeter left popliteal hypermetabolic lymph node w/ max SUV of 5, hypermetabolic mass 4.8 x 3.7 cm right adnexa with an exophytic photopenic cyst with a max SUV of 17.6. 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 Asencio at [facility]. Had echo done 4/6 showing EF of 54%. She was prescribed allopurinol but hasn't started yet. \par Labs from 4/6 showed a slightly elevated ALT/AST of 111/39 with normal bilirubin. Cr 0.63, coags wnl. Total HCG from 3/27 <1.2.\par Hepatitis Bc Ab nonreactive, Hepatitis Bs Ag non reactive, Hepatitis Bs Ab <3, Hepatitis C Ab non reactive, HIV non reactive from 3/27/18.  [de-identified] : She had portacath placed on 4/13/18. \par C1 R-CHOP on 4/16/18. \par s/p LP 4/16, CSF negative for malignant cells\par She reported severe nausea after C1, added d2 and 3 emend. She also had severe hemorrhoids that were causing her pain. Saw GI and surgery, wants to hold off on surgery at this time. Currently improved.\par She is seeing Dr. Wheatley now for ortho  \par C2 on 5/7. Not nauseous like last time. Hemorrhoids improved. \par C3 on 5/30 (b/c of [facility]). This cycle she has more nausea, more fatigue. Has a headache as well. She took d2 and d3 Emend but has not taken zofran or reglan at home.\par \par PET/CT 6/18: 1. Resolution of FDG-avid right adnexal mass as compared to prior study dated 3/15/2018.\par 2. Interval decrease in metabolism associated with a lytic lesion in distal right femur which appears more lucent as compared to prior study. Findings are compatible with response to interval therapy (Deauville 3).\par 3. Resolution of subcentimeter FDG-avid left popliteal lymph node.     \par 4. No new lesion. \par \par C4 on 6/20/18. Saw Dr. Wheatley and Dr. French. She is doing well. \par C5 on 7/11/18. LP held last 2 cycles b/c of symptoms and then insurance issues. \par C6 on 8/1/18. LP also done. Tolerated well. \par \par MRI 9/7/18: MR findings compatible with clinical history of lymphoma of the right distal femoral metadiaphysis with slight interval decreased size and significantly decreased enhancement and perilesional edema. Associated secondary foci of abnormal bone marrow T2 hyperintensity and enhancement in the right distal femoral diaphysis and proximal tibial metaphysis, stable as compared to prior. \par PET 9/12/18: 1. Minimally FDG-avid lytic lesion in distal right femur, not significantly changed as compared to prior study dated 6/18/2018, probably represents treated disease (Deauville 3). \par 2. Nonspecific rectal hypermetabolism, increased as compared to prior study. Please correlate clinically and with proctoscopy, as indicated.\par 3. Study is otherwise unchanged, demonstrating no evidence of recurrent lymphoma.\par \par s/p biopsy of lytic bone lesion showed no operative tumor. Following with Dr. Wheatley. Plan for MRI in 6 months.

## 2018-12-10 NOTE — ASSESSMENT
[FreeTextEntry1] : 44yo F here for management of newly diagnosed DLBCL of femur and right ovary. C1 R-CHOP on 4/16/18.\par s/p C6 on 8/1/18. PET done 9/12/19 showed minimally avid lesion in distal right femur probably represents treated disease. s/p biopsy of lytic bone lesion showing no operable tumor\par \par -cont q3mo visits with labs, scans no more frequently than every 6 months for first 2 years\par -port flush q6 wks\par -counts still low from chemo but improving, will cont to monitor\par -plan for CT scans before next visit\par -f/u w/ Dr. Wheatley as scheduled\par -RTC 3 mo

## 2018-12-10 NOTE — PHYSICAL EXAM
[Fully active, able to carry on all pre-disease performance without restriction] : Status 0 - Fully active, able to carry on all pre-disease performance without restriction [Normal] : normal spine exam without palpable tenderness, no kyphosis or scoliosis

## 2019-01-23 ENCOUNTER — OUTPATIENT (OUTPATIENT)
Dept: OUTPATIENT SERVICES | Facility: HOSPITAL | Age: 44
LOS: 1 days | Discharge: HOME | End: 2019-01-23

## 2019-01-23 DIAGNOSIS — Z98.890 OTHER SPECIFIED POSTPROCEDURAL STATES: Chronic | ICD-10-CM

## 2019-01-23 DIAGNOSIS — C85.90 NON-HODGKIN LYMPHOMA, UNSPECIFIED, UNSPECIFIED SITE: ICD-10-CM

## 2019-01-23 DIAGNOSIS — Z90.49 ACQUIRED ABSENCE OF OTHER SPECIFIED PARTS OF DIGESTIVE TRACT: Chronic | ICD-10-CM

## 2019-02-05 ENCOUNTER — APPOINTMENT (OUTPATIENT)
Dept: INFUSION THERAPY | Facility: HOSPITAL | Age: 44
End: 2019-02-05

## 2019-02-05 ENCOUNTER — RESULT REVIEW (OUTPATIENT)
Age: 44
End: 2019-02-05

## 2019-02-05 ENCOUNTER — LABORATORY RESULT (OUTPATIENT)
Age: 44
End: 2019-02-05

## 2019-02-05 LAB
BASOPHILS # BLD AUTO: 0 K/UL — SIGNIFICANT CHANGE UP (ref 0–0.2)
BASOPHILS NFR BLD AUTO: 0.8 % — SIGNIFICANT CHANGE UP (ref 0–2)
EOSINOPHIL # BLD AUTO: 0.1 K/UL — SIGNIFICANT CHANGE UP (ref 0–0.5)
EOSINOPHIL NFR BLD AUTO: 2.5 % — SIGNIFICANT CHANGE UP (ref 0–6)
HCT VFR BLD CALC: 33.9 % — LOW (ref 34.5–45)
HGB BLD-MCNC: 11.7 G/DL — SIGNIFICANT CHANGE UP (ref 11.5–15.5)
LYMPHOCYTES # BLD AUTO: 0.9 K/UL — LOW (ref 1–3.3)
LYMPHOCYTES # BLD AUTO: 35.3 % — SIGNIFICANT CHANGE UP (ref 13–44)
MCHC RBC-ENTMCNC: 30.3 PG — SIGNIFICANT CHANGE UP (ref 27–34)
MCHC RBC-ENTMCNC: 34.4 G/DL — SIGNIFICANT CHANGE UP (ref 32–36)
MCV RBC AUTO: 87.9 FL — SIGNIFICANT CHANGE UP (ref 80–100)
MONOCYTES # BLD AUTO: 0.2 K/UL — SIGNIFICANT CHANGE UP (ref 0–0.9)
MONOCYTES NFR BLD AUTO: 9.6 % — SIGNIFICANT CHANGE UP (ref 2–14)
NEUTROPHILS # BLD AUTO: 1.3 K/UL — LOW (ref 1.8–7.4)
NEUTROPHILS NFR BLD AUTO: 51.7 % — SIGNIFICANT CHANGE UP (ref 43–77)
PLATELET # BLD AUTO: 177 K/UL — SIGNIFICANT CHANGE UP (ref 150–400)
RBC # BLD: 3.85 M/UL — SIGNIFICANT CHANGE UP (ref 3.8–5.2)
RBC # FLD: 12 % — SIGNIFICANT CHANGE UP (ref 10.3–14.5)
WBC # BLD: 2.5 K/UL — LOW (ref 3.8–10.5)
WBC # FLD AUTO: 2.5 K/UL — LOW (ref 3.8–10.5)

## 2019-02-27 ENCOUNTER — OUTPATIENT (OUTPATIENT)
Dept: OUTPATIENT SERVICES | Facility: HOSPITAL | Age: 44
LOS: 1 days | Discharge: HOME | End: 2019-02-27

## 2019-02-27 DIAGNOSIS — Z98.890 OTHER SPECIFIED POSTPROCEDURAL STATES: Chronic | ICD-10-CM

## 2019-02-27 DIAGNOSIS — C85.90 NON-HODGKIN LYMPHOMA, UNSPECIFIED, UNSPECIFIED SITE: ICD-10-CM

## 2019-02-27 DIAGNOSIS — Z90.49 ACQUIRED ABSENCE OF OTHER SPECIFIED PARTS OF DIGESTIVE TRACT: Chronic | ICD-10-CM

## 2019-03-03 ENCOUNTER — FORM ENCOUNTER (OUTPATIENT)
Age: 44
End: 2019-03-03

## 2019-03-04 ENCOUNTER — OUTPATIENT (OUTPATIENT)
Dept: OUTPATIENT SERVICES | Facility: HOSPITAL | Age: 44
LOS: 1 days | End: 2019-03-04
Payer: COMMERCIAL

## 2019-03-04 ENCOUNTER — APPOINTMENT (OUTPATIENT)
Dept: CT IMAGING | Facility: IMAGING CENTER | Age: 44
End: 2019-03-04
Payer: COMMERCIAL

## 2019-03-04 DIAGNOSIS — Z90.49 ACQUIRED ABSENCE OF OTHER SPECIFIED PARTS OF DIGESTIVE TRACT: Chronic | ICD-10-CM

## 2019-03-04 DIAGNOSIS — C83.39 DIFFUSE LARGE B-CELL LYMPHOMA, EXTRANODAL AND SOLID ORGAN SITES: ICD-10-CM

## 2019-03-04 DIAGNOSIS — Z98.890 OTHER SPECIFIED POSTPROCEDURAL STATES: Chronic | ICD-10-CM

## 2019-03-04 PROCEDURE — 71260 CT THORAX DX C+: CPT

## 2019-03-04 PROCEDURE — 74177 CT ABD & PELVIS W/CONTRAST: CPT | Mod: 26

## 2019-03-04 PROCEDURE — 74177 CT ABD & PELVIS W/CONTRAST: CPT

## 2019-03-04 PROCEDURE — 71260 CT THORAX DX C+: CPT | Mod: 26

## 2019-03-11 ENCOUNTER — APPOINTMENT (OUTPATIENT)
Dept: HEMATOLOGY ONCOLOGY | Facility: CLINIC | Age: 44
End: 2019-03-11
Payer: COMMERCIAL

## 2019-03-11 ENCOUNTER — RESULT REVIEW (OUTPATIENT)
Age: 44
End: 2019-03-11

## 2019-03-11 VITALS
HEART RATE: 74 BPM | BODY MASS INDEX: 27.65 KG/M2 | DIASTOLIC BLOOD PRESSURE: 85 MMHG | SYSTOLIC BLOOD PRESSURE: 120 MMHG | RESPIRATION RATE: 16 BRPM | OXYGEN SATURATION: 100 % | TEMPERATURE: 97.7 F | WEIGHT: 156.08 LBS

## 2019-03-11 LAB
BASOPHILS # BLD AUTO: 0 K/UL — SIGNIFICANT CHANGE UP (ref 0–0.2)
BASOPHILS NFR BLD AUTO: 0.4 % — SIGNIFICANT CHANGE UP (ref 0–2)
EOSINOPHIL # BLD AUTO: 0 K/UL — SIGNIFICANT CHANGE UP (ref 0–0.5)
EOSINOPHIL NFR BLD AUTO: 0 % — SIGNIFICANT CHANGE UP (ref 0–6)
HCT VFR BLD CALC: 35.9 % — SIGNIFICANT CHANGE UP (ref 34.5–45)
HGB BLD-MCNC: 12.8 G/DL — SIGNIFICANT CHANGE UP (ref 11.5–15.5)
LYMPHOCYTES # BLD AUTO: 1.2 K/UL — SIGNIFICANT CHANGE UP (ref 1–3.3)
LYMPHOCYTES # BLD AUTO: 31.7 % — SIGNIFICANT CHANGE UP (ref 13–44)
MCHC RBC-ENTMCNC: 30.7 PG — SIGNIFICANT CHANGE UP (ref 27–34)
MCHC RBC-ENTMCNC: 35.6 G/DL — SIGNIFICANT CHANGE UP (ref 32–36)
MCV RBC AUTO: 86.3 FL — SIGNIFICANT CHANGE UP (ref 80–100)
MONOCYTES # BLD AUTO: 0.3 K/UL — SIGNIFICANT CHANGE UP (ref 0–0.9)
MONOCYTES NFR BLD AUTO: 8.8 % — SIGNIFICANT CHANGE UP (ref 2–14)
NEUTROPHILS # BLD AUTO: 2.2 K/UL — SIGNIFICANT CHANGE UP (ref 1.8–7.4)
NEUTROPHILS NFR BLD AUTO: 59.1 % — SIGNIFICANT CHANGE UP (ref 43–77)
PLAT MORPH BLD: NORMAL — SIGNIFICANT CHANGE UP
PLATELET # BLD AUTO: 205 K/UL — SIGNIFICANT CHANGE UP (ref 150–400)
RBC # BLD: 4.16 M/UL — SIGNIFICANT CHANGE UP (ref 3.8–5.2)
RBC # FLD: 12.1 % — SIGNIFICANT CHANGE UP (ref 10.3–14.5)
RBC BLD AUTO: SIGNIFICANT CHANGE UP
WBC # BLD: 3.8 K/UL — SIGNIFICANT CHANGE UP (ref 3.8–10.5)
WBC # FLD AUTO: 3.8 K/UL — SIGNIFICANT CHANGE UP (ref 3.8–10.5)

## 2019-03-11 PROCEDURE — 99213 OFFICE O/P EST LOW 20 MIN: CPT

## 2019-03-11 NOTE — ASSESSMENT
[FreeTextEntry1] : 42yo F here for management of newly diagnosed DLBCL of femur and right ovary. C1 R-CHOP on 4/16/18.\par s/p C6 on 8/1/18. PET done 9/12/19 showed minimally avid lesion in distal right femur probably represents treated disease. s/p biopsy of lytic bone lesion showing no operable tumor\par \par -cont q3-6mo visits with labs, scans no more frequently than every 6 months for first 2 years\par -port flush q6 wks\par -counts returned to normal range\par -f/u w/ Dr. Wheatley as scheduled\par -RTC 3-6 mo

## 2019-03-11 NOTE — HISTORY OF PRESENT ILLNESS
[de-identified] : 42yo F here for management of newly diagnosed DLBCL. \par \par Pt developed pain in Right knee in 2/2017 as well as some heaviness, swelling, and difficulty walking secondary to pain. Pt had imaging done including an Xray on 4/3/17 which was unremarkable, MRI of the Right knee on 5/30/17 showing nonaggressive appearing enhancing foci within the distal femur and proximal tibia, and a bone scan on 6/6/17 which showed no osteoblastic reactive pathology with focal uptake in the left lateral tibial plateau. Pain resolved spontaneously within a few weeks. \par \par In 2/2018, she had recurrent pain in Right knee. Xray done of right femur on 3/12/18 showed lucent lesion in the distal femur. This was followed by an MRI on 3/8/18 showing a large destructive bone lesion in the dorsum of the distal right femoral metaphysis that is suspicious for malignancy. Had PET done 3/25/18 which showed hypermetabolic lytic lesion right distal femur (SUV max of 13), slight uptake lateral condyle left femur, subcentimeter left popliteal hypermetabolic lymph node w/ max SUV of 5, hypermetabolic mass 4.8 x 3.7 cm right adnexa with an exophytic photopenic cyst with a max SUV of 17.6. 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 Asencio at [facility]. Had echo done 4/6 showing EF of 54%. She was prescribed allopurinol but hasn't started yet. \par Labs from 4/6 showed a slightly elevated ALT/AST of 111/39 with normal bilirubin. Cr 0.63, coags wnl. Total HCG from 3/27 <1.2.\par Hepatitis Bc Ab nonreactive, Hepatitis Bs Ag non reactive, Hepatitis Bs Ab <3, Hepatitis C Ab non reactive, HIV non reactive from 3/27/18.  [de-identified] : She had portacath placed on 4/13/18. \par C1 R-CHOP on 4/16/18. \par s/p LP 4/16, CSF negative for malignant cells\par She reported severe nausea after C1, added d2 and 3 emend. She also had severe hemorrhoids that were causing her pain. Saw GI and surgery, wants to hold off on surgery at this time. Currently improved.\par She is seeing Dr. Wheatley now for ortho  \par C2 on 5/7. Not nauseous like last time. Hemorrhoids improved. \par C3 on 5/30 (b/c of [facility]). This cycle she has more nausea, more fatigue. Has a headache as well. She took d2 and d3 Emend but has not taken zofran or reglan at home.\par \par PET/CT 6/18: 1. Resolution of FDG-avid right adnexal mass as compared to prior study dated 3/15/2018.\par 2. Interval decrease in metabolism associated with a lytic lesion in distal right femur which appears more lucent as compared to prior study. Findings are compatible with response to interval therapy (Deauville 3).\par 3. Resolution of subcentimeter FDG-avid left popliteal lymph node.     \par 4. No new lesion. \par \par C4 on 6/20/18. Saw Dr. Wheatley and Dr. French. She is doing well. \par C5 on 7/11/18. LP held last 2 cycles b/c of symptoms and then insurance issues. \par C6 on 8/1/18. LP also done. Tolerated well. \par \par MRI 9/7/18: MR findings compatible with clinical history of lymphoma of the right distal femoral metadiaphysis with slight interval decreased size and significantly decreased enhancement and perilesional edema. Associated secondary foci of abnormal bone marrow T2 hyperintensity and enhancement in the right distal femoral diaphysis and proximal tibial metaphysis, stable as compared to prior. \par PET 9/12/18: 1. Minimally FDG-avid lytic lesion in distal right femur, not significantly changed as compared to prior study dated 6/18/2018, probably represents treated disease (Deauville 3). \par 2. Nonspecific rectal hypermetabolism, increased as compared to prior study. Please correlate clinically and with proctoscopy, as indicated.\par 3. Study is otherwise unchanged, demonstrating no evidence of recurrent lymphoma.\par \par s/p biopsy of lytic bone lesion showed no operative tumor. \par \par CT C/A/P 3/4/19: No evidence of recurrent disease\par MRI 3/5/19: 1.8 x 3.3 x 4.4 cm lesion within the central aspect of the distal femoral metaphysis extending into the posterior roof of the intercondylar notch and breaching the posterior medial metaphyseal cortex. The lesion is grossly similar in size when compared to the prior examination, although appears much more organized, given the lack of perilesional edema which has markedly improved from the prior examination. The previously seen lesions within the proximal aspect of the central tibia are markedly decreased in conspicuity from the prior exam and indiscernible on today's examination. No pathologic fracture. No new lesions. \par \par She is doing well. Following with Dr. Wheatley - has appt on 3/26/19. Not having pain. \par

## 2019-03-26 ENCOUNTER — APPOINTMENT (OUTPATIENT)
Dept: ORTHOPEDIC SURGERY | Facility: CLINIC | Age: 44
End: 2019-03-26
Payer: COMMERCIAL

## 2019-03-26 ENCOUNTER — APPOINTMENT (OUTPATIENT)
Dept: INFUSION THERAPY | Facility: HOSPITAL | Age: 44
End: 2019-03-26

## 2019-03-26 PROCEDURE — 99213 OFFICE O/P EST LOW 20 MIN: CPT

## 2019-03-26 NOTE — PHYSICAL EXAM
[FreeTextEntry1] : Physical exam reveals a healthy-looking patient in the lumbar and distress patient appeared to be fully alert oriented having no significant complaints no pain examination of the right knee demonstrates full range of motion no swelling no palpable mass. New MRI scan of the right knee distal femur demonstrate a residual lesion involving the distal femur most likely represent a fibrous replacement of pre-existing lymphoma. At this stage this condition was discussed with the patient the need for possible expiration curettage bone grafting was explained Dueñas jimena patient preferred to be followed conservatively and to be seen again in 6 months with a new MRI scan the risk of a possible residual tumor could not be ruled out

## 2019-03-26 NOTE — HISTORY OF PRESENT ILLNESS
[FreeTextEntry1] : Patient was seen today in followup year post multifocal lesions involving the right distal femur proximal tibia diagnosed as non-Hodgkin lymphoma patient completed chemotherapy and gradually return to her full level of activity most of the pain over the right knee subsided.

## 2019-05-09 ENCOUNTER — OUTPATIENT (OUTPATIENT)
Dept: OUTPATIENT SERVICES | Facility: HOSPITAL | Age: 44
LOS: 1 days | Discharge: HOME | End: 2019-05-09

## 2019-05-09 DIAGNOSIS — Z98.890 OTHER SPECIFIED POSTPROCEDURAL STATES: Chronic | ICD-10-CM

## 2019-05-09 DIAGNOSIS — Z90.49 ACQUIRED ABSENCE OF OTHER SPECIFIED PARTS OF DIGESTIVE TRACT: Chronic | ICD-10-CM

## 2019-05-09 DIAGNOSIS — C85.90 NON-HODGKIN LYMPHOMA, UNSPECIFIED, UNSPECIFIED SITE: ICD-10-CM

## 2019-05-13 ENCOUNTER — LABORATORY RESULT (OUTPATIENT)
Age: 44
End: 2019-05-13

## 2019-05-13 ENCOUNTER — APPOINTMENT (OUTPATIENT)
Dept: INFUSION THERAPY | Facility: HOSPITAL | Age: 44
End: 2019-05-13

## 2019-05-13 ENCOUNTER — RESULT REVIEW (OUTPATIENT)
Age: 44
End: 2019-05-13

## 2019-05-13 LAB
BASOPHILS # BLD AUTO: 0 K/UL — SIGNIFICANT CHANGE UP (ref 0–0.2)
BASOPHILS NFR BLD AUTO: 0.5 % — SIGNIFICANT CHANGE UP (ref 0–2)
EOSINOPHIL # BLD AUTO: 0.1 K/UL — SIGNIFICANT CHANGE UP (ref 0–0.5)
EOSINOPHIL NFR BLD AUTO: 1.4 % — SIGNIFICANT CHANGE UP (ref 0–6)
HCT VFR BLD CALC: 36 % — SIGNIFICANT CHANGE UP (ref 34.5–45)
HGB BLD-MCNC: 12.9 G/DL — SIGNIFICANT CHANGE UP (ref 11.5–15.5)
LYMPHOCYTES # BLD AUTO: 1.4 K/UL — SIGNIFICANT CHANGE UP (ref 1–3.3)
LYMPHOCYTES # BLD AUTO: 33.3 % — SIGNIFICANT CHANGE UP (ref 13–44)
MCHC RBC-ENTMCNC: 31.4 PG — SIGNIFICANT CHANGE UP (ref 27–34)
MCHC RBC-ENTMCNC: 35.8 G/DL — SIGNIFICANT CHANGE UP (ref 32–36)
MCV RBC AUTO: 87.7 FL — SIGNIFICANT CHANGE UP (ref 80–100)
MONOCYTES # BLD AUTO: 0.3 K/UL — SIGNIFICANT CHANGE UP (ref 0–0.9)
MONOCYTES NFR BLD AUTO: 7.8 % — SIGNIFICANT CHANGE UP (ref 2–14)
NEUTROPHILS # BLD AUTO: 2.4 K/UL — SIGNIFICANT CHANGE UP (ref 1.8–7.4)
NEUTROPHILS NFR BLD AUTO: 57 % — SIGNIFICANT CHANGE UP (ref 43–77)
PLATELET # BLD AUTO: 221 K/UL — SIGNIFICANT CHANGE UP (ref 150–400)
RBC # BLD: 4.11 M/UL — SIGNIFICANT CHANGE UP (ref 3.8–5.2)
RBC # FLD: 11.7 % — SIGNIFICANT CHANGE UP (ref 10.3–14.5)
WBC # BLD: 4.3 K/UL — SIGNIFICANT CHANGE UP (ref 3.8–10.5)
WBC # FLD AUTO: 4.3 K/UL — SIGNIFICANT CHANGE UP (ref 3.8–10.5)

## 2019-07-03 ENCOUNTER — OUTPATIENT (OUTPATIENT)
Dept: OUTPATIENT SERVICES | Facility: HOSPITAL | Age: 44
LOS: 1 days | Discharge: HOME | End: 2019-07-03

## 2019-07-03 DIAGNOSIS — Z98.890 OTHER SPECIFIED POSTPROCEDURAL STATES: Chronic | ICD-10-CM

## 2019-07-03 DIAGNOSIS — Z90.49 ACQUIRED ABSENCE OF OTHER SPECIFIED PARTS OF DIGESTIVE TRACT: Chronic | ICD-10-CM

## 2019-07-03 DIAGNOSIS — C85.90 NON-HODGKIN LYMPHOMA, UNSPECIFIED, UNSPECIFIED SITE: ICD-10-CM

## 2019-07-08 ENCOUNTER — LABORATORY RESULT (OUTPATIENT)
Age: 44
End: 2019-07-08

## 2019-07-08 ENCOUNTER — RESULT REVIEW (OUTPATIENT)
Age: 44
End: 2019-07-08

## 2019-07-08 ENCOUNTER — APPOINTMENT (OUTPATIENT)
Dept: INFUSION THERAPY | Facility: HOSPITAL | Age: 44
End: 2019-07-08

## 2019-07-08 LAB
BASOPHILS # BLD AUTO: 0 K/UL — SIGNIFICANT CHANGE UP (ref 0–0.2)
BASOPHILS NFR BLD AUTO: 0.3 % — SIGNIFICANT CHANGE UP (ref 0–2)
EOSINOPHIL # BLD AUTO: 0.1 K/UL — SIGNIFICANT CHANGE UP (ref 0–0.5)
EOSINOPHIL NFR BLD AUTO: 1.9 % — SIGNIFICANT CHANGE UP (ref 0–6)
HCT VFR BLD CALC: 38.6 % — SIGNIFICANT CHANGE UP (ref 34.5–45)
HGB BLD-MCNC: 12.7 G/DL — SIGNIFICANT CHANGE UP (ref 11.5–15.5)
LYMPHOCYTES # BLD AUTO: 1.1 K/UL — SIGNIFICANT CHANGE UP (ref 1–3.3)
LYMPHOCYTES # BLD AUTO: 36.1 % — SIGNIFICANT CHANGE UP (ref 13–44)
MCHC RBC-ENTMCNC: 29.6 PG — SIGNIFICANT CHANGE UP (ref 27–34)
MCHC RBC-ENTMCNC: 32.9 G/DL — SIGNIFICANT CHANGE UP (ref 32–36)
MCV RBC AUTO: 89.9 FL — SIGNIFICANT CHANGE UP (ref 80–100)
MONOCYTES # BLD AUTO: 0.3 K/UL — SIGNIFICANT CHANGE UP (ref 0–0.9)
MONOCYTES NFR BLD AUTO: 8.5 % — SIGNIFICANT CHANGE UP (ref 2–14)
NEUTROPHILS # BLD AUTO: 1.6 K/UL — LOW (ref 1.8–7.4)
NEUTROPHILS NFR BLD AUTO: 53.3 % — SIGNIFICANT CHANGE UP (ref 43–77)
PLATELET # BLD AUTO: 196 K/UL — SIGNIFICANT CHANGE UP (ref 150–400)
RBC # BLD: 4.3 M/UL — SIGNIFICANT CHANGE UP (ref 3.8–5.2)
RBC # FLD: 12 % — SIGNIFICANT CHANGE UP (ref 10.3–14.5)
WBC # BLD: 3 K/UL — LOW (ref 3.8–10.5)
WBC # FLD AUTO: 3 K/UL — LOW (ref 3.8–10.5)

## 2019-08-28 ENCOUNTER — OUTPATIENT (OUTPATIENT)
Dept: OUTPATIENT SERVICES | Facility: HOSPITAL | Age: 44
LOS: 1 days | Discharge: HOME | End: 2019-08-28

## 2019-08-28 DIAGNOSIS — Z90.49 ACQUIRED ABSENCE OF OTHER SPECIFIED PARTS OF DIGESTIVE TRACT: Chronic | ICD-10-CM

## 2019-08-28 DIAGNOSIS — C85.90 NON-HODGKIN LYMPHOMA, UNSPECIFIED, UNSPECIFIED SITE: ICD-10-CM

## 2019-08-28 DIAGNOSIS — Z98.890 OTHER SPECIFIED POSTPROCEDURAL STATES: Chronic | ICD-10-CM

## 2019-09-02 ENCOUNTER — FORM ENCOUNTER (OUTPATIENT)
Age: 44
End: 2019-09-02

## 2019-09-03 ENCOUNTER — RESULT REVIEW (OUTPATIENT)
Age: 44
End: 2019-09-03

## 2019-09-03 ENCOUNTER — LABORATORY RESULT (OUTPATIENT)
Age: 44
End: 2019-09-03

## 2019-09-03 ENCOUNTER — APPOINTMENT (OUTPATIENT)
Dept: INFUSION THERAPY | Facility: HOSPITAL | Age: 44
End: 2019-09-03

## 2019-09-03 ENCOUNTER — APPOINTMENT (OUTPATIENT)
Dept: CT IMAGING | Facility: IMAGING CENTER | Age: 44
End: 2019-09-03
Payer: COMMERCIAL

## 2019-09-03 ENCOUNTER — APPOINTMENT (OUTPATIENT)
Age: 44
End: 2019-09-03
Payer: COMMERCIAL

## 2019-09-03 ENCOUNTER — OUTPATIENT (OUTPATIENT)
Dept: OUTPATIENT SERVICES | Facility: HOSPITAL | Age: 44
LOS: 1 days | End: 2019-09-03
Payer: COMMERCIAL

## 2019-09-03 DIAGNOSIS — Z90.49 ACQUIRED ABSENCE OF OTHER SPECIFIED PARTS OF DIGESTIVE TRACT: Chronic | ICD-10-CM

## 2019-09-03 DIAGNOSIS — C85.90 NON-HODGKIN LYMPHOMA, UNSPECIFIED, UNSPECIFIED SITE: ICD-10-CM

## 2019-09-03 DIAGNOSIS — C83.39 DIFFUSE LARGE B-CELL LYMPHOMA, EXTRANODAL AND SOLID ORGAN SITES: ICD-10-CM

## 2019-09-03 DIAGNOSIS — Z98.890 OTHER SPECIFIED POSTPROCEDURAL STATES: Chronic | ICD-10-CM

## 2019-09-03 LAB
BASOPHILS # BLD AUTO: 0 K/UL — SIGNIFICANT CHANGE UP (ref 0–0.2)
BASOPHILS NFR BLD AUTO: 0.4 % — SIGNIFICANT CHANGE UP (ref 0–2)
EOSINOPHIL # BLD AUTO: 0 K/UL — SIGNIFICANT CHANGE UP (ref 0–0.5)
EOSINOPHIL NFR BLD AUTO: 1 % — SIGNIFICANT CHANGE UP (ref 0–6)
HCT VFR BLD CALC: 38.3 % — SIGNIFICANT CHANGE UP (ref 34.5–45)
HGB BLD-MCNC: 12.8 G/DL — SIGNIFICANT CHANGE UP (ref 11.5–15.5)
LYMPHOCYTES # BLD AUTO: 1.4 K/UL — SIGNIFICANT CHANGE UP (ref 1–3.3)
LYMPHOCYTES # BLD AUTO: 35.2 % — SIGNIFICANT CHANGE UP (ref 13–44)
MCHC RBC-ENTMCNC: 30.1 PG — SIGNIFICANT CHANGE UP (ref 27–34)
MCHC RBC-ENTMCNC: 33.5 G/DL — SIGNIFICANT CHANGE UP (ref 32–36)
MCV RBC AUTO: 90.1 FL — SIGNIFICANT CHANGE UP (ref 80–100)
MONOCYTES # BLD AUTO: 0.3 K/UL — SIGNIFICANT CHANGE UP (ref 0–0.9)
MONOCYTES NFR BLD AUTO: 8.5 % — SIGNIFICANT CHANGE UP (ref 2–14)
NEUTROPHILS # BLD AUTO: 2.2 K/UL — SIGNIFICANT CHANGE UP (ref 1.8–7.4)
NEUTROPHILS NFR BLD AUTO: 54.8 % — SIGNIFICANT CHANGE UP (ref 43–77)
PLATELET # BLD AUTO: 200 K/UL — SIGNIFICANT CHANGE UP (ref 150–400)
RBC # BLD: 4.25 M/UL — SIGNIFICANT CHANGE UP (ref 3.8–5.2)
RBC # FLD: 11.9 % — SIGNIFICANT CHANGE UP (ref 10.3–14.5)
WBC # BLD: 4 K/UL — SIGNIFICANT CHANGE UP (ref 3.8–10.5)
WBC # FLD AUTO: 4 K/UL — SIGNIFICANT CHANGE UP (ref 3.8–10.5)

## 2019-09-03 PROCEDURE — 74177 CT ABD & PELVIS W/CONTRAST: CPT | Mod: 26

## 2019-09-03 PROCEDURE — 71260 CT THORAX DX C+: CPT

## 2019-09-03 PROCEDURE — 99213 OFFICE O/P EST LOW 20 MIN: CPT

## 2019-09-03 PROCEDURE — 71260 CT THORAX DX C+: CPT | Mod: 26

## 2019-09-03 PROCEDURE — 74177 CT ABD & PELVIS W/CONTRAST: CPT

## 2019-09-03 NOTE — PHYSICAL EXAM
[FreeTextEntry1] : Physical exam reveals a healthy-looking patient in no apparent distress patient appear to be fully alert oriented having no significant complain x-rays of the right leg demonstrates full range of motion of the knee no swelling no palpable mass needle recent MRI scan of the right knee distal femur demonstrated residual a bone marrow edema with the signal changes within the distal femur suggested the related to previous and underlying process. And he states less likely to have active disease however patient was recommended to continue to be followed by her oncologist and to be seen balance in the 6 months for followup

## 2019-09-03 NOTE — HISTORY OF PRESENT ILLNESS
[FreeTextEntry1] : Patient was seen today in followup with a previous history of malignant lymphoma involving the right distal femur post chemotherapy. At this stage patient is about 2 years post the region of the diagnosis. The patient remained stable disease free having no complaints no pain return to her full level of activity

## 2019-09-24 ENCOUNTER — APPOINTMENT (OUTPATIENT)
Dept: ORTHOPEDIC SURGERY | Facility: CLINIC | Age: 44
End: 2019-09-24

## 2019-09-26 ENCOUNTER — OUTPATIENT (OUTPATIENT)
Dept: OUTPATIENT SERVICES | Facility: HOSPITAL | Age: 44
LOS: 1 days | Discharge: HOME | End: 2019-09-26

## 2019-09-26 DIAGNOSIS — Z98.890 OTHER SPECIFIED POSTPROCEDURAL STATES: Chronic | ICD-10-CM

## 2019-09-26 DIAGNOSIS — Z90.49 ACQUIRED ABSENCE OF OTHER SPECIFIED PARTS OF DIGESTIVE TRACT: Chronic | ICD-10-CM

## 2019-09-26 DIAGNOSIS — C85.90 NON-HODGKIN LYMPHOMA, UNSPECIFIED, UNSPECIFIED SITE: ICD-10-CM

## 2019-09-30 ENCOUNTER — RESULT REVIEW (OUTPATIENT)
Age: 44
End: 2019-09-30

## 2019-09-30 ENCOUNTER — APPOINTMENT (OUTPATIENT)
Dept: HEMATOLOGY ONCOLOGY | Facility: CLINIC | Age: 44
End: 2019-09-30
Payer: COMMERCIAL

## 2019-09-30 VITALS
RESPIRATION RATE: 16 BRPM | BODY MASS INDEX: 27.81 KG/M2 | TEMPERATURE: 98.1 F | DIASTOLIC BLOOD PRESSURE: 70 MMHG | WEIGHT: 156.97 LBS | HEART RATE: 80 BPM | SYSTOLIC BLOOD PRESSURE: 101 MMHG | OXYGEN SATURATION: 100 %

## 2019-09-30 LAB
BASOPHILS # BLD AUTO: 0 K/UL — SIGNIFICANT CHANGE UP (ref 0–0.2)
BASOPHILS NFR BLD AUTO: 0.6 % — SIGNIFICANT CHANGE UP (ref 0–2)
EOSINOPHIL # BLD AUTO: 0.1 K/UL — SIGNIFICANT CHANGE UP (ref 0–0.5)
EOSINOPHIL NFR BLD AUTO: 2.1 % — SIGNIFICANT CHANGE UP (ref 0–6)
HCT VFR BLD CALC: 35.5 % — SIGNIFICANT CHANGE UP (ref 34.5–45)
HGB BLD-MCNC: 12 G/DL — SIGNIFICANT CHANGE UP (ref 11.5–15.5)
LYMPHOCYTES # BLD AUTO: 1.3 K/UL — SIGNIFICANT CHANGE UP (ref 1–3.3)
LYMPHOCYTES # BLD AUTO: 36.8 % — SIGNIFICANT CHANGE UP (ref 13–44)
MCHC RBC-ENTMCNC: 30.2 PG — SIGNIFICANT CHANGE UP (ref 27–34)
MCHC RBC-ENTMCNC: 33.8 G/DL — SIGNIFICANT CHANGE UP (ref 32–36)
MCV RBC AUTO: 89.3 FL — SIGNIFICANT CHANGE UP (ref 80–100)
MONOCYTES # BLD AUTO: 0.4 K/UL — SIGNIFICANT CHANGE UP (ref 0–0.9)
MONOCYTES NFR BLD AUTO: 9.8 % — SIGNIFICANT CHANGE UP (ref 2–14)
NEUTROPHILS # BLD AUTO: 1.8 K/UL — SIGNIFICANT CHANGE UP (ref 1.8–7.4)
NEUTROPHILS NFR BLD AUTO: 50.6 % — SIGNIFICANT CHANGE UP (ref 43–77)
PLATELET # BLD AUTO: 202 K/UL — SIGNIFICANT CHANGE UP (ref 150–400)
RBC # BLD: 3.98 M/UL — SIGNIFICANT CHANGE UP (ref 3.8–5.2)
RBC # FLD: 11.7 % — SIGNIFICANT CHANGE UP (ref 10.3–14.5)
WBC # BLD: 3.6 K/UL — LOW (ref 3.8–10.5)
WBC # FLD AUTO: 3.6 K/UL — LOW (ref 3.8–10.5)

## 2019-09-30 PROCEDURE — 99213 OFFICE O/P EST LOW 20 MIN: CPT

## 2019-09-30 NOTE — ASSESSMENT
[FreeTextEntry1] : 43yo F here for management of newly diagnosed DLBCL of femur and right ovary. C1 R-CHOP on 4/16/18.\par s/p C6 on 8/1/18. PET done 9/12/19 showed minimally avid lesion in distal right femur probably represents treated disease. s/p biopsy of lytic bone lesion showing no operable tumor\par \par -cont q3-6mo visits with labs, scans no more frequently than every 6 months for first 2 years\par -port flush q6 wks\par -counts returned to normal range\par -f/u w/ Dr. Wheatley as scheduled\par -RTC 3-4 mo

## 2019-09-30 NOTE — HISTORY OF PRESENT ILLNESS
[de-identified] : 44yo F here for management of newly diagnosed DLBCL. \par \par Pt developed pain in Right knee in 2/2017 as well as some heaviness, swelling, and difficulty walking secondary to pain. Pt had imaging done including an Xray on 4/3/17 which was unremarkable, MRI of the Right knee on 5/30/17 showing nonaggressive appearing enhancing foci within the distal femur and proximal tibia, and a bone scan on 6/6/17 which showed no osteoblastic reactive pathology with focal uptake in the left lateral tibial plateau. Pain resolved spontaneously within a few weeks. \par \par In 2/2018, she had recurrent pain in Right knee. Xray done of right femur on 3/12/18 showed lucent lesion in the distal femur. This was followed by an MRI on 3/8/18 showing a large destructive bone lesion in the dorsum of the distal right femoral metaphysis that is suspicious for malignancy. Had PET done 3/25/18 which showed hypermetabolic lytic lesion right distal femur (SUV max of 13), slight uptake lateral condyle left femur, subcentimeter left popliteal hypermetabolic lymph node w/ max SUV of 5, hypermetabolic mass 4.8 x 3.7 cm right adnexa with an exophytic photopenic cyst with a max SUV of 17.6. 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 Asencio at [facility]. Had echo done 4/6 showing EF of 54%. She was prescribed allopurinol but hasn't started yet. \par Labs from 4/6 showed a slightly elevated ALT/AST of 111/39 with normal bilirubin. Cr 0.63, coags wnl. Total HCG from 3/27 <1.2.\par Hepatitis Bc Ab nonreactive, Hepatitis Bs Ag non reactive, Hepatitis Bs Ab <3, Hepatitis C Ab non reactive, HIV non reactive from 3/27/18.  [de-identified] : She had portacath placed on 4/13/18. \par C1 R-CHOP on 4/16/18. \par s/p LP 4/16, CSF negative for malignant cells\par She reported severe nausea after C1, added d2 and 3 emend. She also had severe hemorrhoids that were causing her pain. Saw GI and surgery, wants to hold off on surgery at this time. Currently improved.\par She is seeing Dr. Wheatley now for ortho  \par C2 on 5/7. Not nauseous like last time. Hemorrhoids improved. \par C3 on 5/30 (b/c of [facility]). This cycle she has more nausea, more fatigue. Has a headache as well. She took d2 and d3 Emend but has not taken zofran or reglan at home.\par \par PET/CT 6/18: 1. Resolution of FDG-avid right adnexal mass as compared to prior study dated 3/15/2018.\par 2. Interval decrease in metabolism associated with a lytic lesion in distal right femur which appears more lucent as compared to prior study. Findings are compatible with response to interval therapy (Deauville 3).\par 3. Resolution of subcentimeter FDG-avid left popliteal lymph node.     \par 4. No new lesion. \par \par C4 on 6/20/18. Saw Dr. Wheatley and Dr. French. She is doing well. \par C5 on 7/11/18. LP held last 2 cycles b/c of symptoms and then insurance issues. \par C6 on 8/1/18. LP also done. Tolerated well. \par \par MRI 9/7/18: MR findings compatible with clinical history of lymphoma of the right distal femoral metadiaphysis with slight interval decreased size and significantly decreased enhancement and perilesional edema. Associated secondary foci of abnormal bone marrow T2 hyperintensity and enhancement in the right distal femoral diaphysis and proximal tibial metaphysis, stable as compared to prior. \par PET 9/12/18: 1. Minimally FDG-avid lytic lesion in distal right femur, not significantly changed as compared to prior study dated 6/18/2018, probably represents treated disease (Deauville 3). \par 2. Nonspecific rectal hypermetabolism, increased as compared to prior study. Please correlate clinically and with proctoscopy, as indicated.\par 3. Study is otherwise unchanged, demonstrating no evidence of recurrent lymphoma.\par \par s/p biopsy of lytic bone lesion showed no operative tumor. \par \par CT C/A/P 3/4/19: No evidence of recurrent disease\par MRI 3/5/19: 1.8 x 3.3 x 4.4 cm lesion within the central aspect of the distal femoral metaphysis extending into the posterior roof of the intercondylar notch and breaching the posterior medial metaphyseal cortex. The lesion is grossly similar in size when compared to the prior examination, although appears much more organized, given the lack of perilesional edema which has markedly improved from the prior examination. The previously seen lesions within the proximal aspect of the central tibia are markedly decreased in conspicuity from the prior exam and indiscernible on today's examination. No pathologic fracture. No new lesions. \par \par She is doing well. Following with Dr. Dioni zambrano. Not having pain. \par \par CT C/A/P 9/3/19: No significant lymphadenopathy. No significant interval change since 3/4/2019.

## 2019-10-18 ENCOUNTER — OUTPATIENT (OUTPATIENT)
Dept: OUTPATIENT SERVICES | Facility: HOSPITAL | Age: 44
LOS: 1 days | Discharge: HOME | End: 2019-10-18

## 2019-10-18 DIAGNOSIS — Z90.49 ACQUIRED ABSENCE OF OTHER SPECIFIED PARTS OF DIGESTIVE TRACT: Chronic | ICD-10-CM

## 2019-10-18 DIAGNOSIS — C85.90 NON-HODGKIN LYMPHOMA, UNSPECIFIED, UNSPECIFIED SITE: ICD-10-CM

## 2019-10-18 DIAGNOSIS — Z98.890 OTHER SPECIFIED POSTPROCEDURAL STATES: Chronic | ICD-10-CM

## 2019-10-28 ENCOUNTER — APPOINTMENT (OUTPATIENT)
Dept: INFUSION THERAPY | Facility: HOSPITAL | Age: 44
End: 2019-10-28

## 2019-11-25 ENCOUNTER — APPOINTMENT (OUTPATIENT)
Dept: INFUSION THERAPY | Facility: HOSPITAL | Age: 44
End: 2019-11-25

## 2019-12-08 ENCOUNTER — OUTPATIENT (OUTPATIENT)
Dept: OUTPATIENT SERVICES | Facility: HOSPITAL | Age: 44
LOS: 1 days | Discharge: HOME | End: 2019-12-08

## 2019-12-08 DIAGNOSIS — Z98.890 OTHER SPECIFIED POSTPROCEDURAL STATES: Chronic | ICD-10-CM

## 2019-12-08 DIAGNOSIS — Z90.49 ACQUIRED ABSENCE OF OTHER SPECIFIED PARTS OF DIGESTIVE TRACT: Chronic | ICD-10-CM

## 2019-12-08 DIAGNOSIS — C85.90 NON-HODGKIN LYMPHOMA, UNSPECIFIED, UNSPECIFIED SITE: ICD-10-CM

## 2019-12-16 ENCOUNTER — LABORATORY RESULT (OUTPATIENT)
Age: 44
End: 2019-12-16

## 2019-12-16 ENCOUNTER — RESULT REVIEW (OUTPATIENT)
Age: 44
End: 2019-12-16

## 2019-12-16 ENCOUNTER — APPOINTMENT (OUTPATIENT)
Dept: INFUSION THERAPY | Facility: HOSPITAL | Age: 44
End: 2019-12-16

## 2019-12-16 LAB
BASOPHILS # BLD AUTO: 0 K/UL — SIGNIFICANT CHANGE UP (ref 0–0.2)
BASOPHILS NFR BLD AUTO: 0.6 % — SIGNIFICANT CHANGE UP (ref 0–2)
EOSINOPHIL # BLD AUTO: 0.1 K/UL — SIGNIFICANT CHANGE UP (ref 0–0.5)
EOSINOPHIL NFR BLD AUTO: 2 % — SIGNIFICANT CHANGE UP (ref 0–6)
HCT VFR BLD CALC: 37.9 % — SIGNIFICANT CHANGE UP (ref 34.5–45)
HGB BLD-MCNC: 12.4 G/DL — SIGNIFICANT CHANGE UP (ref 11.5–15.5)
LYMPHOCYTES # BLD AUTO: 1.7 K/UL — SIGNIFICANT CHANGE UP (ref 1–3.3)
LYMPHOCYTES # BLD AUTO: 43.7 % — SIGNIFICANT CHANGE UP (ref 13–44)
MCHC RBC-ENTMCNC: 29.3 PG — SIGNIFICANT CHANGE UP (ref 27–34)
MCHC RBC-ENTMCNC: 32.7 G/DL — SIGNIFICANT CHANGE UP (ref 32–36)
MCV RBC AUTO: 89.7 FL — SIGNIFICANT CHANGE UP (ref 80–100)
MONOCYTES # BLD AUTO: 0.3 K/UL — SIGNIFICANT CHANGE UP (ref 0–0.9)
MONOCYTES NFR BLD AUTO: 7.4 % — SIGNIFICANT CHANGE UP (ref 2–14)
NEUTROPHILS # BLD AUTO: 1.8 K/UL — SIGNIFICANT CHANGE UP (ref 1.8–7.4)
NEUTROPHILS NFR BLD AUTO: 46.4 % — SIGNIFICANT CHANGE UP (ref 43–77)
PLATELET # BLD AUTO: 201 K/UL — SIGNIFICANT CHANGE UP (ref 150–400)
RBC # BLD: 4.22 M/UL — SIGNIFICANT CHANGE UP (ref 3.8–5.2)
RBC # FLD: 12.6 % — SIGNIFICANT CHANGE UP (ref 10.3–14.5)
WBC # BLD: 3.9 K/UL — SIGNIFICANT CHANGE UP (ref 3.8–10.5)
WBC # FLD AUTO: 3.9 K/UL — SIGNIFICANT CHANGE UP (ref 3.8–10.5)

## 2020-01-09 ENCOUNTER — OTHER (OUTPATIENT)
Age: 45
End: 2020-01-09

## 2020-01-09 ENCOUNTER — OUTPATIENT (OUTPATIENT)
Dept: OUTPATIENT SERVICES | Facility: HOSPITAL | Age: 45
LOS: 1 days | Discharge: HOME | End: 2020-01-09

## 2020-01-09 DIAGNOSIS — Z90.49 ACQUIRED ABSENCE OF OTHER SPECIFIED PARTS OF DIGESTIVE TRACT: Chronic | ICD-10-CM

## 2020-01-09 DIAGNOSIS — Z98.890 OTHER SPECIFIED POSTPROCEDURAL STATES: Chronic | ICD-10-CM

## 2020-01-09 DIAGNOSIS — C85.90 NON-HODGKIN LYMPHOMA, UNSPECIFIED, UNSPECIFIED SITE: ICD-10-CM

## 2020-01-13 ENCOUNTER — APPOINTMENT (OUTPATIENT)
Dept: INFUSION THERAPY | Facility: HOSPITAL | Age: 45
End: 2020-01-13

## 2020-01-13 ENCOUNTER — LABORATORY RESULT (OUTPATIENT)
Age: 45
End: 2020-01-13

## 2020-01-13 ENCOUNTER — RESULT REVIEW (OUTPATIENT)
Age: 45
End: 2020-01-13

## 2020-01-13 ENCOUNTER — APPOINTMENT (OUTPATIENT)
Dept: HEMATOLOGY ONCOLOGY | Facility: CLINIC | Age: 45
End: 2020-01-13
Payer: COMMERCIAL

## 2020-01-13 VITALS
TEMPERATURE: 97.9 F | DIASTOLIC BLOOD PRESSURE: 85 MMHG | BODY MASS INDEX: 28.98 KG/M2 | HEART RATE: 64 BPM | OXYGEN SATURATION: 100 % | WEIGHT: 163.58 LBS | RESPIRATION RATE: 15 BRPM | SYSTOLIC BLOOD PRESSURE: 116 MMHG

## 2020-01-13 LAB
BASOPHILS # BLD AUTO: 0 K/UL — SIGNIFICANT CHANGE UP (ref 0–0.2)
BASOPHILS NFR BLD AUTO: 0.7 % — SIGNIFICANT CHANGE UP (ref 0–2)
EOSINOPHIL # BLD AUTO: 0.1 K/UL — SIGNIFICANT CHANGE UP (ref 0–0.5)
EOSINOPHIL NFR BLD AUTO: 2 % — SIGNIFICANT CHANGE UP (ref 0–6)
HCT VFR BLD CALC: 38 % — SIGNIFICANT CHANGE UP (ref 34.5–45)
HGB BLD-MCNC: 13 G/DL — SIGNIFICANT CHANGE UP (ref 11.5–15.5)
LYMPHOCYTES # BLD AUTO: 1.5 K/UL — SIGNIFICANT CHANGE UP (ref 1–3.3)
LYMPHOCYTES # BLD AUTO: 39.6 % — SIGNIFICANT CHANGE UP (ref 13–44)
MCHC RBC-ENTMCNC: 30.9 PG — SIGNIFICANT CHANGE UP (ref 27–34)
MCHC RBC-ENTMCNC: 34.3 G/DL — SIGNIFICANT CHANGE UP (ref 32–36)
MCV RBC AUTO: 89.9 FL — SIGNIFICANT CHANGE UP (ref 80–100)
MONOCYTES # BLD AUTO: 0.3 K/UL — SIGNIFICANT CHANGE UP (ref 0–0.9)
MONOCYTES NFR BLD AUTO: 6.9 % — SIGNIFICANT CHANGE UP (ref 2–14)
NEUTROPHILS # BLD AUTO: 1.9 K/UL — SIGNIFICANT CHANGE UP (ref 1.8–7.4)
NEUTROPHILS NFR BLD AUTO: 50.7 % — SIGNIFICANT CHANGE UP (ref 43–77)
PLATELET # BLD AUTO: 191 K/UL — SIGNIFICANT CHANGE UP (ref 150–400)
RBC # BLD: 4.22 M/UL — SIGNIFICANT CHANGE UP (ref 3.8–5.2)
RBC # FLD: 12.8 % — SIGNIFICANT CHANGE UP (ref 10.3–14.5)
WBC # BLD: 3.7 K/UL — LOW (ref 3.8–10.5)
WBC # FLD AUTO: 3.7 K/UL — LOW (ref 3.8–10.5)

## 2020-01-13 PROCEDURE — 99213 OFFICE O/P EST LOW 20 MIN: CPT

## 2020-01-14 NOTE — ASSESSMENT
[FreeTextEntry1] : 45yo F here for management of newly diagnosed DLBCL of femur and right ovary. C1 R-CHOP on 4/16/18.\par s/p C6 on 8/1/18. PET done 9/12/18 showed minimally avid lesion in distal right femur probably represents treated disease. s/p biopsy of lytic bone lesion showing no operable tumor\par \par -cont q3-6mo visits with labs, scans no more frequently than every 6 months for first 2 years\par -port flush q6 wks\par -counts returned to normal range\par -f/u w/ Dr. Wheatley as scheduled\par -RTC 3-4 mo

## 2020-01-14 NOTE — HISTORY OF PRESENT ILLNESS
[de-identified] : 42yo F here for management of newly diagnosed DLBCL. \par \par Pt developed pain in Right knee in 2/2017 as well as some heaviness, swelling, and difficulty walking secondary to pain. Pt had imaging done including an Xray on 4/3/17 which was unremarkable, MRI of the Right knee on 5/30/17 showing nonaggressive appearing enhancing foci within the distal femur and proximal tibia, and a bone scan on 6/6/17 which showed no osteoblastic reactive pathology with focal uptake in the left lateral tibial plateau. Pain resolved spontaneously within a few weeks. \par \par In 2/2018, she had recurrent pain in Right knee. Xray done of right femur on 3/12/18 showed lucent lesion in the distal femur. This was followed by an MRI on 3/8/18 showing a large destructive bone lesion in the dorsum of the distal right femoral metaphysis that is suspicious for malignancy. Had PET done 3/25/18 which showed hypermetabolic lytic lesion right distal femur (SUV max of 13), slight uptake lateral condyle left femur, subcentimeter left popliteal hypermetabolic lymph node w/ max SUV of 5, hypermetabolic mass 4.8 x 3.7 cm right adnexa with an exophytic photopenic cyst with a max SUV of 17.6. 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 Asencio at [facility]. Had echo done 4/6 showing EF of 54%. She was prescribed allopurinol but hasn't started yet. \par Labs from 4/6 showed a slightly elevated ALT/AST of 111/39 with normal bilirubin. Cr 0.63, coags wnl. Total HCG from 3/27 <1.2.\par Hepatitis Bc Ab nonreactive, Hepatitis Bs Ag non reactive, Hepatitis Bs Ab <3, Hepatitis C Ab non reactive, HIV non reactive from 3/27/18.  [de-identified] : She had portacath placed on 4/13/18. \par C1 R-CHOP on 4/16/18. \par s/p LP 4/16, CSF negative for malignant cells\par She reported severe nausea after C1, added d2 and 3 emend. She also had severe hemorrhoids that were causing her pain. Saw GI and surgery, wants to hold off on surgery at this time. Currently improved.\par She is seeing Dr. Wheatley now for ortho  \par C2 on 5/7/18. Not nauseous like last time. Hemorrhoids improved. \par C3 on 5/30/18 (b/c of [facility]). This cycle she has more nausea, more fatigue. Has a headache as well. She took d2 and d3 Emend but has not taken zofran or reglan at home.\par \par PET/CT 6/18/18: 1. Resolution of FDG-avid right adnexal mass as compared to prior study dated 3/15/2018.\par 2. Interval decrease in metabolism associated with a lytic lesion in distal right femur which appears more lucent as compared to prior study. Findings are compatible with response to interval therapy (Deauville 3).\par 3. Resolution of subcentimeter FDG-avid left popliteal lymph node.     \par 4. No new lesion. \par \par C4 on 6/20/18. Saw Dr. Wheatley and Dr. French. She is doing well. \par C5 on 7/11/18. LP held last 2 cycles b/c of symptoms and then insurance issues. \par C6 on 8/1/18. LP also done. Tolerated well. \par \par MRI 9/7/18: MR findings compatible with clinical history of lymphoma of the right distal femoral metadiaphysis with slight interval decreased size and significantly decreased enhancement and perilesional edema. Associated secondary foci of abnormal bone marrow T2 hyperintensity and enhancement in the right distal femoral diaphysis and proximal tibial metaphysis, stable as compared to prior. \par PET 9/12/18: 1. Minimally FDG-avid lytic lesion in distal right femur, not significantly changed as compared to prior study dated 6/18/2018, probably represents treated disease (Deauville 3). \par 2. Nonspecific rectal hypermetabolism, increased as compared to prior study. Please correlate clinically and with proctoscopy, as indicated.\par 3. Study is otherwise unchanged, demonstrating no evidence of recurrent lymphoma.\par \par s/p biopsy of lytic bone lesion showed no operative tumor. \par \par CT C/A/P 3/4/19: No evidence of recurrent disease\par MRI 3/5/19: 1.8 x 3.3 x 4.4 cm lesion within the central aspect of the distal femoral metaphysis extending into the posterior roof of the intercondylar notch and breaching the posterior medial metaphyseal cortex. The lesion is grossly similar in size when compared to the prior examination, although appears much more organized, given the lack of perilesional edema which has markedly improved from the prior examination. The previously seen lesions within the proximal aspect of the central tibia are markedly decreased in conspicuity from the prior exam and indiscernible on today's examination. No pathologic fracture. No new lesions. \par \par She is doing well. Following with Dr. Dioni zambrano. Not having pain. Gaining weight. \par \par CT C/A/P 9/3/19: No significant lymphadenopathy. No significant interval change since 3/4/2019.

## 2020-02-03 ENCOUNTER — APPOINTMENT (OUTPATIENT)
Dept: MAMMOGRAPHY | Facility: IMAGING CENTER | Age: 45
End: 2020-02-03
Payer: COMMERCIAL

## 2020-02-03 ENCOUNTER — APPOINTMENT (OUTPATIENT)
Dept: ULTRASOUND IMAGING | Facility: IMAGING CENTER | Age: 45
End: 2020-02-03
Payer: COMMERCIAL

## 2020-02-03 ENCOUNTER — OUTPATIENT (OUTPATIENT)
Dept: OUTPATIENT SERVICES | Facility: HOSPITAL | Age: 45
LOS: 1 days | End: 2020-02-03
Payer: COMMERCIAL

## 2020-02-03 DIAGNOSIS — Z98.890 OTHER SPECIFIED POSTPROCEDURAL STATES: Chronic | ICD-10-CM

## 2020-02-03 DIAGNOSIS — Z90.49 ACQUIRED ABSENCE OF OTHER SPECIFIED PARTS OF DIGESTIVE TRACT: Chronic | ICD-10-CM

## 2020-02-03 DIAGNOSIS — Z00.8 ENCOUNTER FOR OTHER GENERAL EXAMINATION: ICD-10-CM

## 2020-02-03 PROCEDURE — 77067 SCR MAMMO BI INCL CAD: CPT | Mod: 26

## 2020-02-03 PROCEDURE — 76641 ULTRASOUND BREAST COMPLETE: CPT

## 2020-02-03 PROCEDURE — 77063 BREAST TOMOSYNTHESIS BI: CPT

## 2020-02-03 PROCEDURE — 77067 SCR MAMMO BI INCL CAD: CPT

## 2020-02-03 PROCEDURE — 77063 BREAST TOMOSYNTHESIS BI: CPT | Mod: 26

## 2020-02-03 PROCEDURE — 76641 ULTRASOUND BREAST COMPLETE: CPT | Mod: 26,50

## 2020-02-18 ENCOUNTER — OUTPATIENT (OUTPATIENT)
Dept: OUTPATIENT SERVICES | Facility: HOSPITAL | Age: 45
LOS: 1 days | Discharge: HOME | End: 2020-02-18

## 2020-02-18 DIAGNOSIS — Z98.890 OTHER SPECIFIED POSTPROCEDURAL STATES: Chronic | ICD-10-CM

## 2020-02-18 DIAGNOSIS — Z90.49 ACQUIRED ABSENCE OF OTHER SPECIFIED PARTS OF DIGESTIVE TRACT: Chronic | ICD-10-CM

## 2020-02-18 DIAGNOSIS — C85.90 NON-HODGKIN LYMPHOMA, UNSPECIFIED, UNSPECIFIED SITE: ICD-10-CM

## 2020-02-23 ENCOUNTER — FORM ENCOUNTER (OUTPATIENT)
Age: 45
End: 2020-02-23

## 2020-02-24 ENCOUNTER — RESULT REVIEW (OUTPATIENT)
Age: 45
End: 2020-02-24

## 2020-02-24 ENCOUNTER — LABORATORY RESULT (OUTPATIENT)
Age: 45
End: 2020-02-24

## 2020-02-24 ENCOUNTER — APPOINTMENT (OUTPATIENT)
Dept: CT IMAGING | Facility: IMAGING CENTER | Age: 45
End: 2020-02-24

## 2020-02-24 ENCOUNTER — OUTPATIENT (OUTPATIENT)
Dept: OUTPATIENT SERVICES | Facility: HOSPITAL | Age: 45
LOS: 1 days | End: 2020-02-24
Payer: COMMERCIAL

## 2020-02-24 ENCOUNTER — APPOINTMENT (OUTPATIENT)
Dept: INFUSION THERAPY | Facility: HOSPITAL | Age: 45
End: 2020-02-24

## 2020-02-24 DIAGNOSIS — Z98.890 OTHER SPECIFIED POSTPROCEDURAL STATES: Chronic | ICD-10-CM

## 2020-02-24 DIAGNOSIS — Z90.49 ACQUIRED ABSENCE OF OTHER SPECIFIED PARTS OF DIGESTIVE TRACT: Chronic | ICD-10-CM

## 2020-02-24 DIAGNOSIS — C83.39 DIFFUSE LARGE B-CELL LYMPHOMA, EXTRANODAL AND SOLID ORGAN SITES: ICD-10-CM

## 2020-02-24 LAB
BASOPHILS # BLD AUTO: 0 K/UL — SIGNIFICANT CHANGE UP (ref 0–0.2)
BASOPHILS NFR BLD AUTO: 0.7 % — SIGNIFICANT CHANGE UP (ref 0–2)
EOSINOPHIL # BLD AUTO: 0.1 K/UL — SIGNIFICANT CHANGE UP (ref 0–0.5)
EOSINOPHIL NFR BLD AUTO: 2.1 % — SIGNIFICANT CHANGE UP (ref 0–6)
HCT VFR BLD CALC: 37.2 % — SIGNIFICANT CHANGE UP (ref 34.5–45)
HGB BLD-MCNC: 12.7 G/DL — SIGNIFICANT CHANGE UP (ref 11.5–15.5)
LYMPHOCYTES # BLD AUTO: 1.6 K/UL — SIGNIFICANT CHANGE UP (ref 1–3.3)
LYMPHOCYTES # BLD AUTO: 45.3 % — HIGH (ref 13–44)
MCHC RBC-ENTMCNC: 30.9 PG — SIGNIFICANT CHANGE UP (ref 27–34)
MCHC RBC-ENTMCNC: 34 G/DL — SIGNIFICANT CHANGE UP (ref 32–36)
MCV RBC AUTO: 90.9 FL — SIGNIFICANT CHANGE UP (ref 80–100)
MONOCYTES # BLD AUTO: 0.3 K/UL — SIGNIFICANT CHANGE UP (ref 0–0.9)
MONOCYTES NFR BLD AUTO: 7.4 % — SIGNIFICANT CHANGE UP (ref 2–14)
NEUTROPHILS # BLD AUTO: 1.6 K/UL — LOW (ref 1.8–7.4)
NEUTROPHILS NFR BLD AUTO: 44.5 % — SIGNIFICANT CHANGE UP (ref 43–77)
PLATELET # BLD AUTO: 183 K/UL — SIGNIFICANT CHANGE UP (ref 150–400)
RBC # BLD: 4.09 M/UL — SIGNIFICANT CHANGE UP (ref 3.8–5.2)
RBC # FLD: 12.4 % — SIGNIFICANT CHANGE UP (ref 10.3–14.5)
WBC # BLD: 3.5 K/UL — LOW (ref 3.8–10.5)
WBC # FLD AUTO: 3.5 K/UL — LOW (ref 3.8–10.5)

## 2020-02-24 PROCEDURE — 74177 CT ABD & PELVIS W/CONTRAST: CPT

## 2020-02-24 PROCEDURE — 71260 CT THORAX DX C+: CPT

## 2020-02-24 PROCEDURE — 74177 CT ABD & PELVIS W/CONTRAST: CPT | Mod: 26

## 2020-02-24 PROCEDURE — 71260 CT THORAX DX C+: CPT | Mod: 26

## 2020-03-01 ENCOUNTER — FORM ENCOUNTER (OUTPATIENT)
Age: 45
End: 2020-03-01

## 2020-03-02 ENCOUNTER — APPOINTMENT (OUTPATIENT)
Dept: MRI IMAGING | Facility: IMAGING CENTER | Age: 45
End: 2020-03-02
Payer: COMMERCIAL

## 2020-03-02 ENCOUNTER — OUTPATIENT (OUTPATIENT)
Dept: OUTPATIENT SERVICES | Facility: HOSPITAL | Age: 45
LOS: 1 days | End: 2020-03-02
Payer: COMMERCIAL

## 2020-03-02 DIAGNOSIS — Z90.49 ACQUIRED ABSENCE OF OTHER SPECIFIED PARTS OF DIGESTIVE TRACT: Chronic | ICD-10-CM

## 2020-03-02 DIAGNOSIS — Z98.890 OTHER SPECIFIED POSTPROCEDURAL STATES: Chronic | ICD-10-CM

## 2020-03-02 DIAGNOSIS — Z00.8 ENCOUNTER FOR OTHER GENERAL EXAMINATION: ICD-10-CM

## 2020-03-02 PROCEDURE — 73720 MRI LWR EXTREMITY W/O&W/DYE: CPT

## 2020-03-02 PROCEDURE — 73720 MRI LWR EXTREMITY W/O&W/DYE: CPT | Mod: 26,RT

## 2020-03-02 PROCEDURE — A9585: CPT

## 2020-03-03 ENCOUNTER — APPOINTMENT (OUTPATIENT)
Dept: ORTHOPEDIC SURGERY | Facility: CLINIC | Age: 45
End: 2020-03-03
Payer: COMMERCIAL

## 2020-03-03 PROCEDURE — 73560 X-RAY EXAM OF KNEE 1 OR 2: CPT | Mod: RT

## 2020-03-03 PROCEDURE — 73552 X-RAY EXAM OF FEMUR 2/>: CPT | Mod: RT

## 2020-03-03 PROCEDURE — 99213 OFFICE O/P EST LOW 20 MIN: CPT

## 2020-03-03 NOTE — PHYSICAL EXAM
[FreeTextEntry1] : Physical exam reveals a healthy-looking patient in no apparent distress patient appeared to be fully alert oriented having no complaints no pain x-rays of the right leg demonstrates full range of motion of the hip and knee joints no swelling no palpable mass no gross neurovascular deficit recent MRI scan of the right knee distal femur demonstrate shrinkage of a underlying process.\par X-ray of the right femur and knee show no antecedent and underlying pathology at this stage patient will be followed and will be seen again in one year for followup the risk of a possible future local recurrence could not be ruled out

## 2020-03-03 NOTE — HISTORY OF PRESENT ILLNESS
[FreeTextEntry1] : Patient was seen today in routine followup with her previous medical history of malignant lymphoma involving the right distal femur patient was treated by chemotherapy with a good outcome the underlying process seems to dissolve and subsided patient gradually return to full level of activity at this point is having no significant complaints

## 2020-03-09 ENCOUNTER — RESULT REVIEW (OUTPATIENT)
Age: 45
End: 2020-03-09

## 2020-03-09 ENCOUNTER — APPOINTMENT (OUTPATIENT)
Dept: HEMATOLOGY ONCOLOGY | Facility: CLINIC | Age: 45
End: 2020-03-09
Payer: COMMERCIAL

## 2020-03-09 VITALS
DIASTOLIC BLOOD PRESSURE: 72 MMHG | TEMPERATURE: 98.1 F | WEIGHT: 165.34 LBS | RESPIRATION RATE: 17 BRPM | SYSTOLIC BLOOD PRESSURE: 122 MMHG | OXYGEN SATURATION: 100 % | HEART RATE: 71 BPM | BODY MASS INDEX: 29.29 KG/M2

## 2020-03-09 LAB
BASOPHILS # BLD AUTO: 0.02 K/UL — SIGNIFICANT CHANGE UP (ref 0–0.2)
BASOPHILS NFR BLD AUTO: 0.6 % — SIGNIFICANT CHANGE UP (ref 0–2)
EOSINOPHIL # BLD AUTO: 0.05 K/UL — SIGNIFICANT CHANGE UP (ref 0–0.5)
EOSINOPHIL NFR BLD AUTO: 1.5 % — SIGNIFICANT CHANGE UP (ref 0–6)
HCT VFR BLD CALC: 41.4 % — SIGNIFICANT CHANGE UP (ref 34.5–45)
HGB BLD-MCNC: 13.3 G/DL — SIGNIFICANT CHANGE UP (ref 11.5–15.5)
IMM GRANULOCYTES NFR BLD AUTO: 0 % — SIGNIFICANT CHANGE UP (ref 0–1.5)
LYMPHOCYTES # BLD AUTO: 1.46 K/UL — SIGNIFICANT CHANGE UP (ref 1–3.3)
LYMPHOCYTES # BLD AUTO: 43.3 % — SIGNIFICANT CHANGE UP (ref 13–44)
MCHC RBC-ENTMCNC: 29.8 PG — SIGNIFICANT CHANGE UP (ref 27–34)
MCHC RBC-ENTMCNC: 32.1 GM/DL — SIGNIFICANT CHANGE UP (ref 32–36)
MCV RBC AUTO: 92.6 FL — SIGNIFICANT CHANGE UP (ref 80–100)
MONOCYTES # BLD AUTO: 0.26 K/UL — SIGNIFICANT CHANGE UP (ref 0–0.9)
MONOCYTES NFR BLD AUTO: 7.7 % — SIGNIFICANT CHANGE UP (ref 2–14)
NEUTROPHILS # BLD AUTO: 1.58 K/UL — LOW (ref 1.8–7.4)
NEUTROPHILS NFR BLD AUTO: 46.9 % — SIGNIFICANT CHANGE UP (ref 43–77)
NRBC # BLD: 0 /100 WBCS — SIGNIFICANT CHANGE UP (ref 0–0)
PLATELET # BLD AUTO: 230 K/UL — SIGNIFICANT CHANGE UP (ref 150–400)
RBC # BLD: 4.47 M/UL — SIGNIFICANT CHANGE UP (ref 3.8–5.2)
RBC # FLD: 12.9 % — SIGNIFICANT CHANGE UP (ref 10.3–14.5)
WBC # BLD: 3.37 K/UL — LOW (ref 3.8–10.5)
WBC # FLD AUTO: 3.37 K/UL — LOW (ref 3.8–10.5)

## 2020-03-09 PROCEDURE — 99213 OFFICE O/P EST LOW 20 MIN: CPT

## 2020-03-09 NOTE — ASSESSMENT
[FreeTextEntry1] : 46yo F here for management of newly diagnosed DLBCL of femur and right ovary. C1 R-CHOP on 4/16/18.\par s/p C6 on 8/1/18. PET done 9/12/18 showed minimally avid lesion in distal right femur probably represents treated disease. s/p biopsy of lytic bone lesion showing no operable tumor\par \par -cont q3-6mo visits with labs, scans no more frequently than every 6 months for first 2 years. Last scan due in 8/2020\par -port flush q6 wks. Can d/c portacath after scan in Aug -will need PT/INR drawn at next visit\par -counts returned to normal range\par -f/u w/ Dr. Wheatley as scheduled\par -RTC 3-4 mo

## 2020-03-09 NOTE — HISTORY OF PRESENT ILLNESS
[de-identified] : 44yo F here for management of newly diagnosed DLBCL. \par \par Pt developed pain in Right knee in 2/2017 as well as some heaviness, swelling, and difficulty walking secondary to pain. Pt had imaging done including an Xray on 4/3/17 which was unremarkable, MRI of the Right knee on 5/30/17 showing nonaggressive appearing enhancing foci within the distal femur and proximal tibia, and a bone scan on 6/6/17 which showed no osteoblastic reactive pathology with focal uptake in the left lateral tibial plateau. Pain resolved spontaneously within a few weeks. \par \par In 2/2018, she had recurrent pain in Right knee. Xray done of right femur on 3/12/18 showed lucent lesion in the distal femur. This was followed by an MRI on 3/8/18 showing a large destructive bone lesion in the dorsum of the distal right femoral metaphysis that is suspicious for malignancy. Had PET done 3/25/18 which showed hypermetabolic lytic lesion right distal femur (SUV max of 13), slight uptake lateral condyle left femur, subcentimeter left popliteal hypermetabolic lymph node w/ max SUV of 5, hypermetabolic mass 4.8 x 3.7 cm right adnexa with an exophytic photopenic cyst with a max SUV of 17.6. 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 Asencio at [facility]. Had echo done 4/6 showing EF of 54%. She was prescribed allopurinol but hasn't started yet. \par Labs from 4/6 showed a slightly elevated ALT/AST of 111/39 with normal bilirubin. Cr 0.63, coags wnl. Total HCG from 3/27 <1.2.\par Hepatitis Bc Ab nonreactive, Hepatitis Bs Ag non reactive, Hepatitis Bs Ab <3, Hepatitis C Ab non reactive, HIV non reactive from 3/27/18.  [de-identified] : She had portacath placed on 4/13/18. \par C1 R-CHOP on 4/16/18. \par s/p LP 4/16, CSF negative for malignant cells\par She reported severe nausea after C1, added d2 and 3 emend. She also had severe hemorrhoids that were causing her pain. Saw GI and surgery, wants to hold off on surgery at this time. Currently improved.\par She is seeing Dr. Wheatley now for ortho  \par C2 on 5/7/18. Not nauseous like last time. Hemorrhoids improved. \par C3 on 5/30/18 (b/c of [facility]). This cycle she has more nausea, more fatigue. Has a headache as well. She took d2 and d3 Emend but has not taken zofran or reglan at home.\par \par PET/CT 6/18/18: 1. Resolution of FDG-avid right adnexal mass as compared to prior study dated 3/15/2018.\par 2. Interval decrease in metabolism associated with a lytic lesion in distal right femur which appears more lucent as compared to prior study. Findings are compatible with response to interval therapy (Deauville 3).\par 3. Resolution of subcentimeter FDG-avid left popliteal lymph node.     \par 4. No new lesion. \par \par C4 on 6/20/18. Saw Dr. Wheatley and Dr. French. She is doing well. \par C5 on 7/11/18. LP held last 2 cycles b/c of symptoms and then insurance issues. \par C6 on 8/1/18. LP also done. Tolerated well. \par \par MRI 9/7/18: MR findings compatible with clinical history of lymphoma of the right distal femoral metadiaphysis with slight interval decreased size and significantly decreased enhancement and perilesional edema. Associated secondary foci of abnormal bone marrow T2 hyperintensity and enhancement in the right distal femoral diaphysis and proximal tibial metaphysis, stable as compared to prior. \par PET 9/12/18: 1. Minimally FDG-avid lytic lesion in distal right femur, not significantly changed as compared to prior study dated 6/18/2018, probably represents treated disease (Deauville 3). \par 2. Nonspecific rectal hypermetabolism, increased as compared to prior study. Please correlate clinically and with proctoscopy, as indicated.\par 3. Study is otherwise unchanged, demonstrating no evidence of recurrent lymphoma.\par \par s/p biopsy of lytic bone lesion showed no operative tumor. \par \par CT C/A/P 3/4/19: No evidence of recurrent disease\par MRI 3/5/19: 1.8 x 3.3 x 4.4 cm lesion within the central aspect of the distal femoral metaphysis extending into the posterior roof of the intercondylar notch and breaching the posterior medial metaphyseal cortex. The lesion is grossly similar in size when compared to the prior examination, although appears much more organized, given the lack of perilesional edema which has markedly improved from the prior examination. The previously seen lesions within the proximal aspect of the central tibia are markedly decreased in conspicuity from the prior exam and indiscernible on today's examination. No pathologic fracture. No new lesions. \par \par She is doing well. Following with Dr. Dioni zambrano. Not having pain. Gaining weight. \par \par CT C/A/P 9/3/19: No significant lymphadenopathy. No significant interval change since 3/4/2019.\par \par CT 2/24/20: no lymphadenopathy in the chest, abdomen or pelvis. \par MRI femur 3/5/20: Marked interval reduction in size of previously seen distal femoral lesion. There is a small amount of residual high T2 signal within this region which is favored to represent posttreatment change or avascular necrosis over residual lymphoma.

## 2020-03-10 LAB
ALBUMIN SERPL ELPH-MCNC: 4.8 G/DL
ALP BLD-CCNC: 66 U/L
ALT SERPL-CCNC: 17 U/L
ANION GAP SERPL CALC-SCNC: 11 MMOL/L
AST SERPL-CCNC: 24 U/L
BILIRUB SERPL-MCNC: 0.6 MG/DL
BUN SERPL-MCNC: 12 MG/DL
CALCIUM SERPL-MCNC: 10.1 MG/DL
CHLORIDE SERPL-SCNC: 103 MMOL/L
CO2 SERPL-SCNC: 27 MMOL/L
CREAT SERPL-MCNC: 0.76 MG/DL
GLUCOSE SERPL-MCNC: 85 MG/DL
LDH SERPL-CCNC: 162 U/L
POTASSIUM SERPL-SCNC: 4.7 MMOL/L
PROT SERPL-MCNC: 7.3 G/DL
SODIUM SERPL-SCNC: 142 MMOL/L

## 2020-04-06 ENCOUNTER — APPOINTMENT (OUTPATIENT)
Dept: INFUSION THERAPY | Facility: HOSPITAL | Age: 45
End: 2020-04-06

## 2020-05-05 ENCOUNTER — OUTPATIENT (OUTPATIENT)
Dept: OUTPATIENT SERVICES | Facility: HOSPITAL | Age: 45
LOS: 1 days | Discharge: HOME | End: 2020-05-05

## 2020-05-05 DIAGNOSIS — Z98.890 OTHER SPECIFIED POSTPROCEDURAL STATES: Chronic | ICD-10-CM

## 2020-05-05 DIAGNOSIS — C85.90 NON-HODGKIN LYMPHOMA, UNSPECIFIED, UNSPECIFIED SITE: ICD-10-CM

## 2020-05-05 DIAGNOSIS — Z90.49 ACQUIRED ABSENCE OF OTHER SPECIFIED PARTS OF DIGESTIVE TRACT: Chronic | ICD-10-CM

## 2020-05-11 ENCOUNTER — APPOINTMENT (OUTPATIENT)
Dept: INFUSION THERAPY | Facility: HOSPITAL | Age: 45
End: 2020-05-11

## 2020-06-23 ENCOUNTER — OUTPATIENT (OUTPATIENT)
Dept: OUTPATIENT SERVICES | Facility: HOSPITAL | Age: 45
LOS: 1 days | Discharge: HOME | End: 2020-06-23

## 2020-06-23 DIAGNOSIS — C85.90 NON-HODGKIN LYMPHOMA, UNSPECIFIED, UNSPECIFIED SITE: ICD-10-CM

## 2020-06-23 DIAGNOSIS — Z98.890 OTHER SPECIFIED POSTPROCEDURAL STATES: Chronic | ICD-10-CM

## 2020-06-23 DIAGNOSIS — Z90.49 ACQUIRED ABSENCE OF OTHER SPECIFIED PARTS OF DIGESTIVE TRACT: Chronic | ICD-10-CM

## 2020-06-29 ENCOUNTER — APPOINTMENT (OUTPATIENT)
Dept: INFUSION THERAPY | Facility: HOSPITAL | Age: 45
End: 2020-06-29

## 2020-07-06 ENCOUNTER — APPOINTMENT (OUTPATIENT)
Dept: HEMATOLOGY ONCOLOGY | Facility: CLINIC | Age: 45
End: 2020-07-06
Payer: MEDICAID

## 2020-07-06 ENCOUNTER — RESULT REVIEW (OUTPATIENT)
Age: 45
End: 2020-07-06

## 2020-07-06 ENCOUNTER — LABORATORY RESULT (OUTPATIENT)
Age: 45
End: 2020-07-06

## 2020-07-06 ENCOUNTER — APPOINTMENT (OUTPATIENT)
Dept: INFUSION THERAPY | Facility: HOSPITAL | Age: 45
End: 2020-07-06

## 2020-07-06 VITALS
HEART RATE: 65 BPM | RESPIRATION RATE: 18 BRPM | WEIGHT: 167.55 LBS | TEMPERATURE: 98.4 F | SYSTOLIC BLOOD PRESSURE: 120 MMHG | OXYGEN SATURATION: 100 % | DIASTOLIC BLOOD PRESSURE: 82 MMHG | BODY MASS INDEX: 29.68 KG/M2

## 2020-07-06 LAB
BASOPHILS # BLD AUTO: 0.02 K/UL — SIGNIFICANT CHANGE UP (ref 0–0.2)
BASOPHILS NFR BLD AUTO: 0.6 % — SIGNIFICANT CHANGE UP (ref 0–2)
EOSINOPHIL # BLD AUTO: 0.06 K/UL — SIGNIFICANT CHANGE UP (ref 0–0.5)
EOSINOPHIL NFR BLD AUTO: 1.8 % — SIGNIFICANT CHANGE UP (ref 0–6)
HCT VFR BLD CALC: 37.4 % — SIGNIFICANT CHANGE UP (ref 34.5–45)
HGB BLD-MCNC: 12.6 G/DL — SIGNIFICANT CHANGE UP (ref 11.5–15.5)
IMM GRANULOCYTES NFR BLD AUTO: 0 % — SIGNIFICANT CHANGE UP (ref 0–1.5)
LYMPHOCYTES # BLD AUTO: 1.52 K/UL — SIGNIFICANT CHANGE UP (ref 1–3.3)
LYMPHOCYTES # BLD AUTO: 45.4 % — HIGH (ref 13–44)
MCHC RBC-ENTMCNC: 30.1 PG — SIGNIFICANT CHANGE UP (ref 27–34)
MCHC RBC-ENTMCNC: 33.7 GM/DL — SIGNIFICANT CHANGE UP (ref 32–36)
MCV RBC AUTO: 89.5 FL — SIGNIFICANT CHANGE UP (ref 80–100)
MONOCYTES # BLD AUTO: 0.29 K/UL — SIGNIFICANT CHANGE UP (ref 0–0.9)
MONOCYTES NFR BLD AUTO: 8.7 % — SIGNIFICANT CHANGE UP (ref 2–14)
NEUTROPHILS # BLD AUTO: 1.46 K/UL — LOW (ref 1.8–7.4)
NEUTROPHILS NFR BLD AUTO: 43.5 % — SIGNIFICANT CHANGE UP (ref 43–77)
NRBC # BLD: 0 /100 WBCS — SIGNIFICANT CHANGE UP (ref 0–0)
PLATELET # BLD AUTO: 186 K/UL — SIGNIFICANT CHANGE UP (ref 150–400)
RBC # BLD: 4.18 M/UL — SIGNIFICANT CHANGE UP (ref 3.8–5.2)
RBC # FLD: 12.7 % — SIGNIFICANT CHANGE UP (ref 10.3–14.5)
WBC # BLD: 3.35 K/UL — LOW (ref 3.8–10.5)
WBC # FLD AUTO: 3.35 K/UL — LOW (ref 3.8–10.5)

## 2020-07-06 PROCEDURE — 99213 OFFICE O/P EST LOW 20 MIN: CPT

## 2020-07-06 NOTE — HISTORY OF PRESENT ILLNESS
[de-identified] : 42yo F here for management of newly diagnosed DLBCL. \par \par Pt developed pain in Right knee in 2/2017 as well as some heaviness, swelling, and difficulty walking secondary to pain. Pt had imaging done including an Xray on 4/3/17 which was unremarkable, MRI of the Right knee on 5/30/17 showing nonaggressive appearing enhancing foci within the distal femur and proximal tibia, and a bone scan on 6/6/17 which showed no osteoblastic reactive pathology with focal uptake in the left lateral tibial plateau. Pain resolved spontaneously within a few weeks. \par \par In 2/2018, she had recurrent pain in Right knee. Xray done of right femur on 3/12/18 showed lucent lesion in the distal femur. This was followed by an MRI on 3/8/18 showing a large destructive bone lesion in the dorsum of the distal right femoral metaphysis that is suspicious for malignancy. Had PET done 3/25/18 which showed hypermetabolic lytic lesion right distal femur (SUV max of 13), slight uptake lateral condyle left femur, subcentimeter left popliteal hypermetabolic lymph node w/ max SUV of 5, hypermetabolic mass 4.8 x 3.7 cm right adnexa with an exophytic photopenic cyst with a max SUV of 17.6. 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 Asencio at [facility]. Had echo done 4/6 showing EF of 54%. She was prescribed allopurinol but hasn't started yet. \par Labs from 4/6 showed a slightly elevated ALT/AST of 111/39 with normal bilirubin. Cr 0.63, coags wnl. Total HCG from 3/27 <1.2.\par Hepatitis Bc Ab nonreactive, Hepatitis Bs Ag non reactive, Hepatitis Bs Ab <3, Hepatitis C Ab non reactive, HIV non reactive from 3/27/18.  [de-identified] : She had portacath placed on 4/13/18. \par C1 R-CHOP on 4/16/18. \par s/p LP 4/16, CSF negative for malignant cells\par She reported severe nausea after C1, added d2 and 3 emend. She also had severe hemorrhoids that were causing her pain. Saw GI and surgery, wants to hold off on surgery at this time. Currently improved.\par She is seeing Dr. Wheatley now for ortho  \par C2 on 5/7/18. Not nauseous like last time. Hemorrhoids improved. \par C3 on 5/30/18 (b/c of [facility]). This cycle she has more nausea, more fatigue. Has a headache as well. She took d2 and d3 Emend but has not taken zofran or reglan at home.\par \par PET/CT 6/18/18: 1. Resolution of FDG-avid right adnexal mass as compared to prior study dated 3/15/2018.\par 2. Interval decrease in metabolism associated with a lytic lesion in distal right femur which appears more lucent as compared to prior study. Findings are compatible with response to interval therapy (Deauville 3).\par 3. Resolution of subcentimeter FDG-avid left popliteal lymph node.     \par 4. No new lesion. \par \par C4 on 6/20/18. Saw Dr. Wheatley and Dr. French. She is doing well. \par C5 on 7/11/18. LP held last 2 cycles b/c of symptoms and then insurance issues. \par C6 on 8/1/18. LP also done. Tolerated well. \par \par MRI 9/7/18: MR findings compatible with clinical history of lymphoma of the right distal femoral metadiaphysis with slight interval decreased size and significantly decreased enhancement and perilesional edema. Associated secondary foci of abnormal bone marrow T2 hyperintensity and enhancement in the right distal femoral diaphysis and proximal tibial metaphysis, stable as compared to prior. \par PET 9/12/18: 1. Minimally FDG-avid lytic lesion in distal right femur, not significantly changed as compared to prior study dated 6/18/2018, probably represents treated disease (Deauville 3). \par 2. Nonspecific rectal hypermetabolism, increased as compared to prior study. Please correlate clinically and with proctoscopy, as indicated.\par 3. Study is otherwise unchanged, demonstrating no evidence of recurrent lymphoma.\par \par s/p biopsy of lytic bone lesion showed no operative tumor. \par \par CT C/A/P 3/4/19: No evidence of recurrent disease\par MRI 3/5/19: 1.8 x 3.3 x 4.4 cm lesion within the central aspect of the distal femoral metaphysis extending into the posterior roof of the intercondylar notch and breaching the posterior medial metaphyseal cortex. The lesion is grossly similar in size when compared to the prior examination, although appears much more organized, given the lack of perilesional edema which has markedly improved from the prior examination. The previously seen lesions within the proximal aspect of the central tibia are markedly decreased in conspicuity from the prior exam and indiscernible on today's examination. No pathologic fracture. No new lesions. \par \par CT C/A/P 9/3/19: No significant lymphadenopathy. No significant interval change since 3/4/2019.\par \par CT 2/24/20: no lymphadenopathy in the chest, abdomen or pelvis. \par MRI femur 3/5/20: Marked interval reduction in size of previously seen distal femoral lesion. There is a small amount of residual high T2 signal within this region which is favored to represent posttreatment change or avascular necrosis over residual lymphoma. \par \par Doing well. No new pain. \par Had port flush today.

## 2020-07-06 NOTE — ASSESSMENT
[FreeTextEntry1] : 46yo F here for management of newly diagnosed DLBCL of femur and right ovary. C1 R-CHOP on 4/16/18.\par s/p C6 on 8/1/18. PET done 9/12/18 showed minimally avid lesion in distal right femur probably represents treated disease. s/p biopsy of lytic bone lesion showing no operable tumor\par \par -cont q3-6mo visits with labs, scans no more frequently than every 6 months for first 2 years. Last scan due in 8/2020 -ordered today\par -port flush q6 wks. Can d/c portacath after scan in Aug -will need PT/INR drawn at next visit\par -f/u w/ Dr. Wheatley as scheduled\par -RTC 2 mo (after surveillance imaging)

## 2020-08-01 ENCOUNTER — OUTPATIENT (OUTPATIENT)
Dept: OUTPATIENT SERVICES | Facility: HOSPITAL | Age: 45
LOS: 1 days | Discharge: HOME | End: 2020-08-01

## 2020-08-01 DIAGNOSIS — Z98.890 OTHER SPECIFIED POSTPROCEDURAL STATES: Chronic | ICD-10-CM

## 2020-08-01 DIAGNOSIS — C85.90 NON-HODGKIN LYMPHOMA, UNSPECIFIED, UNSPECIFIED SITE: ICD-10-CM

## 2020-08-01 DIAGNOSIS — Z90.49 ACQUIRED ABSENCE OF OTHER SPECIFIED PARTS OF DIGESTIVE TRACT: Chronic | ICD-10-CM

## 2020-08-17 ENCOUNTER — APPOINTMENT (OUTPATIENT)
Dept: INFUSION THERAPY | Facility: HOSPITAL | Age: 45
End: 2020-08-17

## 2020-08-31 ENCOUNTER — APPOINTMENT (OUTPATIENT)
Dept: CT IMAGING | Facility: IMAGING CENTER | Age: 45
End: 2020-08-31
Payer: COMMERCIAL

## 2020-08-31 ENCOUNTER — OUTPATIENT (OUTPATIENT)
Dept: OUTPATIENT SERVICES | Facility: HOSPITAL | Age: 45
LOS: 1 days | End: 2020-08-31
Payer: MEDICAID

## 2020-08-31 ENCOUNTER — APPOINTMENT (OUTPATIENT)
Dept: MRI IMAGING | Facility: IMAGING CENTER | Age: 45
End: 2020-08-31
Payer: COMMERCIAL

## 2020-08-31 DIAGNOSIS — C83.39 DIFFUSE LARGE B-CELL LYMPHOMA, EXTRANODAL AND SOLID ORGAN SITES: ICD-10-CM

## 2020-08-31 DIAGNOSIS — Z98.890 OTHER SPECIFIED POSTPROCEDURAL STATES: Chronic | ICD-10-CM

## 2020-08-31 DIAGNOSIS — Z90.49 ACQUIRED ABSENCE OF OTHER SPECIFIED PARTS OF DIGESTIVE TRACT: Chronic | ICD-10-CM

## 2020-08-31 PROCEDURE — 74177 CT ABD & PELVIS W/CONTRAST: CPT

## 2020-08-31 PROCEDURE — 71260 CT THORAX DX C+: CPT

## 2020-08-31 PROCEDURE — 74177 CT ABD & PELVIS W/CONTRAST: CPT | Mod: 26

## 2020-08-31 PROCEDURE — A9585: CPT

## 2020-08-31 PROCEDURE — 73723 MRI JOINT LWR EXTR W/O&W/DYE: CPT | Mod: 26,RT

## 2020-08-31 PROCEDURE — 71260 CT THORAX DX C+: CPT | Mod: 26

## 2020-08-31 PROCEDURE — 73723 MRI JOINT LWR EXTR W/O&W/DYE: CPT

## 2020-09-08 ENCOUNTER — OUTPATIENT (OUTPATIENT)
Dept: OUTPATIENT SERVICES | Facility: HOSPITAL | Age: 45
LOS: 1 days | Discharge: HOME | End: 2020-09-08

## 2020-09-08 DIAGNOSIS — Z90.49 ACQUIRED ABSENCE OF OTHER SPECIFIED PARTS OF DIGESTIVE TRACT: Chronic | ICD-10-CM

## 2020-09-08 DIAGNOSIS — Z98.890 OTHER SPECIFIED POSTPROCEDURAL STATES: Chronic | ICD-10-CM

## 2020-09-08 DIAGNOSIS — C85.90 NON-HODGKIN LYMPHOMA, UNSPECIFIED, UNSPECIFIED SITE: ICD-10-CM

## 2020-09-21 ENCOUNTER — APPOINTMENT (OUTPATIENT)
Dept: HEMATOLOGY ONCOLOGY | Facility: CLINIC | Age: 45
End: 2020-09-21
Payer: COMMERCIAL

## 2020-09-21 ENCOUNTER — LABORATORY RESULT (OUTPATIENT)
Age: 45
End: 2020-09-21

## 2020-09-21 ENCOUNTER — APPOINTMENT (OUTPATIENT)
Dept: ORTHOPEDIC SURGERY | Facility: CLINIC | Age: 45
End: 2020-09-21
Payer: COMMERCIAL

## 2020-09-21 ENCOUNTER — RESULT REVIEW (OUTPATIENT)
Age: 45
End: 2020-09-21

## 2020-09-21 ENCOUNTER — APPOINTMENT (OUTPATIENT)
Dept: INFUSION THERAPY | Facility: HOSPITAL | Age: 45
End: 2020-09-21

## 2020-09-21 VITALS
BODY MASS INDEX: 30.65 KG/M2 | WEIGHT: 173 LBS | SYSTOLIC BLOOD PRESSURE: 109 MMHG | DIASTOLIC BLOOD PRESSURE: 78 MMHG | HEART RATE: 65 BPM | HEIGHT: 63 IN

## 2020-09-21 VITALS
TEMPERATURE: 97.3 F | HEART RATE: 67 BPM | HEIGHT: 63 IN | WEIGHT: 173.06 LBS | BODY MASS INDEX: 30.66 KG/M2 | RESPIRATION RATE: 18 BRPM | DIASTOLIC BLOOD PRESSURE: 80 MMHG | SYSTOLIC BLOOD PRESSURE: 113 MMHG | OXYGEN SATURATION: 100 %

## 2020-09-21 LAB
BASOPHILS # BLD AUTO: 0.02 K/UL — SIGNIFICANT CHANGE UP (ref 0–0.2)
BASOPHILS NFR BLD AUTO: 0.5 % — SIGNIFICANT CHANGE UP (ref 0–2)
EOSINOPHIL # BLD AUTO: 0.07 K/UL — SIGNIFICANT CHANGE UP (ref 0–0.5)
EOSINOPHIL NFR BLD AUTO: 1.9 % — SIGNIFICANT CHANGE UP (ref 0–6)
HCT VFR BLD CALC: 34.6 % — SIGNIFICANT CHANGE UP (ref 34.5–45)
HGB BLD-MCNC: 11.2 G/DL — LOW (ref 11.5–15.5)
IMM GRANULOCYTES NFR BLD AUTO: 0.3 % — SIGNIFICANT CHANGE UP (ref 0–1.5)
LYMPHOCYTES # BLD AUTO: 1.56 K/UL — SIGNIFICANT CHANGE UP (ref 1–3.3)
LYMPHOCYTES # BLD AUTO: 41.5 % — SIGNIFICANT CHANGE UP (ref 13–44)
MCHC RBC-ENTMCNC: 29.2 PG — SIGNIFICANT CHANGE UP (ref 27–34)
MCHC RBC-ENTMCNC: 32.4 G/DL — SIGNIFICANT CHANGE UP (ref 32–36)
MCV RBC AUTO: 90.1 FL — SIGNIFICANT CHANGE UP (ref 80–100)
MONOCYTES # BLD AUTO: 0.25 K/UL — SIGNIFICANT CHANGE UP (ref 0–0.9)
MONOCYTES NFR BLD AUTO: 6.6 % — SIGNIFICANT CHANGE UP (ref 2–14)
NEUTROPHILS # BLD AUTO: 1.85 K/UL — SIGNIFICANT CHANGE UP (ref 1.8–7.4)
NEUTROPHILS NFR BLD AUTO: 49.2 % — SIGNIFICANT CHANGE UP (ref 43–77)
NRBC # BLD: 0 /100 WBCS — SIGNIFICANT CHANGE UP (ref 0–0)
PLATELET # BLD AUTO: 186 K/UL — SIGNIFICANT CHANGE UP (ref 150–400)
RBC # BLD: 3.84 M/UL — SIGNIFICANT CHANGE UP (ref 3.8–5.2)
RBC # FLD: 12.9 % — SIGNIFICANT CHANGE UP (ref 10.3–14.5)
WBC # BLD: 3.76 K/UL — LOW (ref 3.8–10.5)
WBC # FLD AUTO: 3.76 K/UL — LOW (ref 3.8–10.5)

## 2020-09-21 PROCEDURE — 99213 OFFICE O/P EST LOW 20 MIN: CPT

## 2020-09-21 PROCEDURE — 99203 OFFICE O/P NEW LOW 30 MIN: CPT

## 2020-09-21 NOTE — PHYSICAL EXAM
[Fully active, able to carry on all pre-disease performance without restriction] : Status 0 - Fully active, able to carry on all pre-disease performance without restriction [Normal] : supple without JVD, no thyromegaly or masses appreciated [de-identified] : R elbow/forearm tender, no erythema or discoloration

## 2020-09-21 NOTE — ASSESSMENT
[FreeTextEntry1] : 46yo F here for management of newly diagnosed DLBCL of femur and right ovary. C1 R-CHOP on 4/16/18.\par s/p C6 on 8/1/18. PET done 9/12/18 showed minimally avid lesion in distal right femur probably represents treated disease. s/p biopsy of lytic bone lesion showing no operable tumor\par \par -cont q3-6mo visits with labs, scans no more frequently than every 6 months for first 2 years. Last scan done in 8/2020 -CR\par -port flush q6 wks\par -f/u w/ Dr. Wheatley as scheduled\par -has ortho appt today for R elbow pain\par -RTC 6 m

## 2020-09-21 NOTE — HISTORY OF PRESENT ILLNESS
[de-identified] : 42yo F here for management of newly diagnosed DLBCL. \par \par Pt developed pain in Right knee in 2/2017 as well as some heaviness, swelling, and difficulty walking secondary to pain. Pt had imaging done including an Xray on 4/3/17 which was unremarkable, MRI of the Right knee on 5/30/17 showing nonaggressive appearing enhancing foci within the distal femur and proximal tibia, and a bone scan on 6/6/17 which showed no osteoblastic reactive pathology with focal uptake in the left lateral tibial plateau. Pain resolved spontaneously within a few weeks. \par \par In 2/2018, she had recurrent pain in Right knee. Xray done of right femur on 3/12/18 showed lucent lesion in the distal femur. This was followed by an MRI on 3/8/18 showing a large destructive bone lesion in the dorsum of the distal right femoral metaphysis that is suspicious for malignancy. Had PET done 3/25/18 which showed hypermetabolic lytic lesion right distal femur (SUV max of 13), slight uptake lateral condyle left femur, subcentimeter left popliteal hypermetabolic lymph node w/ max SUV of 5, hypermetabolic mass 4.8 x 3.7 cm right adnexa with an exophytic photopenic cyst with a max SUV of 17.6. 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 Asencio at [facility]. Had echo done 4/6 showing EF of 54%. She was prescribed allopurinol but hasn't started yet. \par Labs from 4/6 showed a slightly elevated ALT/AST of 111/39 with normal bilirubin. Cr 0.63, coags wnl. Total HCG from 3/27 <1.2.\par Hepatitis Bc Ab nonreactive, Hepatitis Bs Ag non reactive, Hepatitis Bs Ab <3, Hepatitis C Ab non reactive, HIV non reactive from 3/27/18.  [de-identified] : She had portacath placed on 4/13/18. \par C1 R-CHOP on 4/16/18. \par s/p LP 4/16, CSF negative for malignant cells\par She reported severe nausea after C1, added d2 and 3 emend. She also had severe hemorrhoids that were causing her pain. Saw GI and surgery, wants to hold off on surgery at this time. Currently improved.\par She is seeing Dr. Wheatley now for ortho  \par C2 on 5/7/18. Not nauseous like last time. Hemorrhoids improved. \par C3 on 5/30/18 (b/c of [facility]). This cycle she has more nausea, more fatigue. Has a headache as well. She took d2 and d3 Emend but has not taken zofran or reglan at home.\par \par PET/CT 6/18/18: 1. Resolution of FDG-avid right adnexal mass as compared to prior study dated 3/15/2018.\par 2. Interval decrease in metabolism associated with a lytic lesion in distal right femur which appears more lucent as compared to prior study. Findings are compatible with response to interval therapy (Deauville 3).\par 3. Resolution of subcentimeter FDG-avid left popliteal lymph node.     \par 4. No new lesion. \par \par C4 on 6/20/18. Saw Dr. Wheatley and Dr. French. She is doing well. \par C5 on 7/11/18. LP held last 2 cycles b/c of symptoms and then insurance issues. \par C6 on 8/1/18. LP also done. Tolerated well. \par \par MRI 9/7/18: MR findings compatible with clinical history of lymphoma of the right distal femoral metadiaphysis with slight interval decreased size and significantly decreased enhancement and perilesional edema. Associated secondary foci of abnormal bone marrow T2 hyperintensity and enhancement in the right distal femoral diaphysis and proximal tibial metaphysis, stable as compared to prior. \par PET 9/12/18: 1. Minimally FDG-avid lytic lesion in distal right femur, not significantly changed as compared to prior study dated 6/18/2018, probably represents treated disease (Deauville 3). \par 2. Nonspecific rectal hypermetabolism, increased as compared to prior study. Please correlate clinically and with proctoscopy, as indicated.\par 3. Study is otherwise unchanged, demonstrating no evidence of recurrent lymphoma.\par \par s/p biopsy of lytic bone lesion showed no operative tumor. \par \par CT C/A/P 3/4/19: No evidence of recurrent disease\par MRI 3/5/19: 1.8 x 3.3 x 4.4 cm lesion within the central aspect of the distal femoral metaphysis extending into the posterior roof of the intercondylar notch and breaching the posterior medial metaphyseal cortex. The lesion is grossly similar in size when compared to the prior examination, although appears much more organized, given the lack of perilesional edema which has markedly improved from the prior examination. The previously seen lesions within the proximal aspect of the central tibia are markedly decreased in conspicuity from the prior exam and indiscernible on today's examination. No pathologic fracture. No new lesions. \par \par CT C/A/P 9/3/19: No significant lymphadenopathy. No significant interval change since 3/4/2019.\par \par CT 2/24/20: no lymphadenopathy in the chest, abdomen or pelvis. \par MRI femur 3/5/20: Marked interval reduction in size of previously seen distal femoral lesion. There is a small amount of residual high T2 signal within this region which is favored to represent posttreatment change or avascular necrosis over residual lymphoma. \par \par CT 8/31/20: Stable exam.\par No evidence of suspicious mass or lymphadenopathy in the chest, abdomen, or pelvis.\par MRI knee 8/31/20: Signal abnormality at the right distal femoral metaphysis posteriorly which is consistent with a treated or partially treated lesion and less likely bone infarction.\par \par Doing well. c/o R elbow pain and swelling down R forearm x 10 days. Denies any trauma. Has appt with ortho today at 2:30. \par Had port flush today.

## 2020-09-21 NOTE — PHYSICAL EXAM
[de-identified] : Constitutional: Well-nourished, well-developed, No acute distress\par Respiratory:  Good respiratory effort, no SOB\par Lymphatic: No regional lymphadenopathy, no lymphedema\par Psychiatric: Pleasant and normal affect, alert and oriented x3\par Skin: Clean dry and intact B/L UE/LE\par Musculoskeletal: normal except where as noted in regional exam\par \par \par Left Elbow:\par APPEARANCE: no marked deformities, no swelling or malalignment\par POSITIVE TENDERNESS: none\par NONTENDER: lateral and medial epicondyles, posterior capsule, and other areas of the elbow. \par ROM: full & painless flext/ext, pronation/supination. \par RESISTIVE TESTING: painless resisted elbow flexion/extension, wrist/long finger extension. painless resisted wrist/long finger flexion. painless resisted supination/pronation. \par SPECIAL TESTS: stable v/v stress. neg tinel's cubital tunnel\par Vasc: 2+ radial pulse\par Neuro: AIN, PIN, Ulnar nerve intact to motor\par Sensation: Intact to light touch throughout\par B/L Shoulders:  No asymmetry, malalignment, or swelling, Full ROM, 5/5 strength in flexion/ext, Abd/Add, IR/ER, Joints stable\par B/L Wrist and Hand:  No asymmetry, malalignment, or swelling, Full ROM, 5/5 strength in wrist and long finger flexion/ext, and radial/ulnar deviation, Joints stable\par \par Right Elbow:\par APPEARANCE: no marked deformities, no swelling or malalignment\par POSITIVE TENDERNESS: + Small mildly tender nodular subcutaneous collection of swelling located in the proximal posterior forearm along the medial ulnar border\par NONTENDER: lateral and medial epicondyles, posterior capsule, and other areas of the elbow. \par ROM: full & painless flexion/extension, pronation/supination. \par RESISTIVE TESTING: painless resisted elbow flexion/extension, wrist/long finger extension. painless resisted wrist/long finger flexion. painless resisted supination/pronation. \par SPECIAL TESTS: stable v/v stress. neg tinel's cubital tunnel\par Vasc: 2+ radial pulse\par Neuro: AIN, PIN, Ulnar nerve intact to motor\par Sensation: Intact to light touch throughout

## 2020-09-21 NOTE — DISCUSSION/SUMMARY
[de-identified] : Patient was seen today for evaluation and management of 2 weeks of atraumatic right forearm pain.  Patient has a small nodular density, feels like a palpable subcutaneous collection of swelling that is tender to the touch.  Differential diagnosis includes subcutaneous cyst, lipoma, or other intradermal growth.  Patient has history of lymphoma, I advised the patient and I do not believe this is a dermal or muscular tumor.  Recommend watchful waiting of this issue, if patient has persisting pain, or if this small nodular density increases in size would recommend follow-up at that time for repeat evaluation.  X-ray would be of likely limited utility at this time as this does not appear to be a bony abnormality.  MRI would also be of limited benefit as this nodular density is quite small in nature and would be difficult to classify based on size/location.  May consider imaging at next visit if patient has persistence of pain or increase of nodular density size.  Follow up as needed.  Patient appreciates and agrees with current plan.\par \par This note was generated using dragon medical dictation software.  A reasonable effort has been made for proofreading its contents, but typos may still remain.  If there are any questions or points of clarification needed please notify my office.

## 2020-09-21 NOTE — HISTORY OF PRESENT ILLNESS
[de-identified] : RHD Patient is here for right forearm pain that began about 2 weeks ago. She does not recall any inciting injury. The pain is intermittent, worse when touching the area. 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 She has used essential oils to treat it. Denies N/T/R/Prior injury. \par \par The patient's past medical history, past surgical history, medications and allergies were reviewed by me today and documented accordingly. In addition, the patient's family and social history, which were noncontributory to this visit, were reviewed also. Intake form was reviewed. The patient has no family history of arthritis.

## 2020-09-21 NOTE — REASON FOR VISIT
[Initial Visit] : an initial visit for [Family Member] : family member [FreeTextEntry2] : right forearm pain

## 2020-09-25 DIAGNOSIS — M79.631 PAIN IN RIGHT FOREARM: ICD-10-CM

## 2020-09-25 DIAGNOSIS — S59.901A UNSPECIFIED INJURY OF RIGHT ELBOW, INITIAL ENCOUNTER: ICD-10-CM

## 2020-10-09 ENCOUNTER — OUTPATIENT (OUTPATIENT)
Dept: OUTPATIENT SERVICES | Facility: HOSPITAL | Age: 45
LOS: 1 days | End: 2020-10-09

## 2020-10-09 ENCOUNTER — RESULT REVIEW (OUTPATIENT)
Age: 45
End: 2020-10-09

## 2020-10-09 ENCOUNTER — APPOINTMENT (OUTPATIENT)
Dept: MRI IMAGING | Facility: CLINIC | Age: 45
End: 2020-10-09
Payer: COMMERCIAL

## 2020-10-09 DIAGNOSIS — Z98.890 OTHER SPECIFIED POSTPROCEDURAL STATES: Chronic | ICD-10-CM

## 2020-10-09 DIAGNOSIS — Z90.49 ACQUIRED ABSENCE OF OTHER SPECIFIED PARTS OF DIGESTIVE TRACT: Chronic | ICD-10-CM

## 2020-10-09 PROCEDURE — 73223 MRI JOINT UPR EXTR W/O&W/DYE: CPT | Mod: 26,RT

## 2020-10-09 PROCEDURE — 73220 MRI UPPR EXTREMITY W/O&W/DYE: CPT | Mod: 26,RT

## 2020-10-26 ENCOUNTER — OUTPATIENT (OUTPATIENT)
Dept: OUTPATIENT SERVICES | Facility: HOSPITAL | Age: 45
LOS: 1 days | Discharge: HOME | End: 2020-10-26

## 2020-10-26 DIAGNOSIS — Z90.49 ACQUIRED ABSENCE OF OTHER SPECIFIED PARTS OF DIGESTIVE TRACT: Chronic | ICD-10-CM

## 2020-10-26 DIAGNOSIS — Z98.890 OTHER SPECIFIED POSTPROCEDURAL STATES: Chronic | ICD-10-CM

## 2020-10-26 DIAGNOSIS — C85.90 NON-HODGKIN LYMPHOMA, UNSPECIFIED, UNSPECIFIED SITE: ICD-10-CM

## 2020-11-02 ENCOUNTER — LABORATORY RESULT (OUTPATIENT)
Age: 45
End: 2020-11-02

## 2020-11-02 ENCOUNTER — APPOINTMENT (OUTPATIENT)
Dept: INFUSION THERAPY | Facility: HOSPITAL | Age: 45
End: 2020-11-02

## 2020-11-02 ENCOUNTER — RESULT REVIEW (OUTPATIENT)
Age: 45
End: 2020-11-02

## 2020-11-02 LAB
BASOPHILS # BLD AUTO: 0.02 K/UL — SIGNIFICANT CHANGE UP (ref 0–0.2)
BASOPHILS NFR BLD AUTO: 0.6 % — SIGNIFICANT CHANGE UP (ref 0–2)
EOSINOPHIL # BLD AUTO: 0.05 K/UL — SIGNIFICANT CHANGE UP (ref 0–0.5)
EOSINOPHIL NFR BLD AUTO: 1.5 % — SIGNIFICANT CHANGE UP (ref 0–6)
HCT VFR BLD CALC: 42.1 % — SIGNIFICANT CHANGE UP (ref 34.5–45)
HGB BLD-MCNC: 13.6 G/DL — SIGNIFICANT CHANGE UP (ref 11.5–15.5)
IMM GRANULOCYTES NFR BLD AUTO: 0 % — SIGNIFICANT CHANGE UP (ref 0–1.5)
LYMPHOCYTES # BLD AUTO: 1.43 K/UL — SIGNIFICANT CHANGE UP (ref 1–3.3)
LYMPHOCYTES # BLD AUTO: 44 % — SIGNIFICANT CHANGE UP (ref 13–44)
MCHC RBC-ENTMCNC: 28.9 PG — SIGNIFICANT CHANGE UP (ref 27–34)
MCHC RBC-ENTMCNC: 32.3 G/DL — SIGNIFICANT CHANGE UP (ref 32–36)
MCV RBC AUTO: 89.6 FL — SIGNIFICANT CHANGE UP (ref 80–100)
MONOCYTES # BLD AUTO: 0.22 K/UL — SIGNIFICANT CHANGE UP (ref 0–0.9)
MONOCYTES NFR BLD AUTO: 6.8 % — SIGNIFICANT CHANGE UP (ref 2–14)
NEUTROPHILS # BLD AUTO: 1.53 K/UL — LOW (ref 1.8–7.4)
NEUTROPHILS NFR BLD AUTO: 47.1 % — SIGNIFICANT CHANGE UP (ref 43–77)
NRBC # BLD: 0 /100 WBCS — SIGNIFICANT CHANGE UP (ref 0–0)
PLATELET # BLD AUTO: 189 K/UL — SIGNIFICANT CHANGE UP (ref 150–400)
RBC # BLD: 4.7 M/UL — SIGNIFICANT CHANGE UP (ref 3.8–5.2)
RBC # FLD: 12.8 % — SIGNIFICANT CHANGE UP (ref 10.3–14.5)
WBC # BLD: 3.25 K/UL — LOW (ref 3.8–10.5)
WBC # FLD AUTO: 3.25 K/UL — LOW (ref 3.8–10.5)

## 2020-12-09 ENCOUNTER — OUTPATIENT (OUTPATIENT)
Dept: OUTPATIENT SERVICES | Facility: HOSPITAL | Age: 45
LOS: 1 days | Discharge: HOME | End: 2020-12-09

## 2020-12-09 DIAGNOSIS — Z98.890 OTHER SPECIFIED POSTPROCEDURAL STATES: Chronic | ICD-10-CM

## 2020-12-09 DIAGNOSIS — Z90.49 ACQUIRED ABSENCE OF OTHER SPECIFIED PARTS OF DIGESTIVE TRACT: Chronic | ICD-10-CM

## 2020-12-09 DIAGNOSIS — C85.90 NON-HODGKIN LYMPHOMA, UNSPECIFIED, UNSPECIFIED SITE: ICD-10-CM

## 2020-12-14 ENCOUNTER — RESULT REVIEW (OUTPATIENT)
Age: 45
End: 2020-12-14

## 2020-12-14 ENCOUNTER — LABORATORY RESULT (OUTPATIENT)
Age: 45
End: 2020-12-14

## 2020-12-14 ENCOUNTER — APPOINTMENT (OUTPATIENT)
Dept: INFUSION THERAPY | Facility: HOSPITAL | Age: 45
End: 2020-12-14

## 2020-12-14 LAB
BASOPHILS # BLD AUTO: 0.03 K/UL — SIGNIFICANT CHANGE UP (ref 0–0.2)
BASOPHILS NFR BLD AUTO: 0.9 % — SIGNIFICANT CHANGE UP (ref 0–2)
EOSINOPHIL # BLD AUTO: 0.04 K/UL — SIGNIFICANT CHANGE UP (ref 0–0.5)
EOSINOPHIL NFR BLD AUTO: 1.2 % — SIGNIFICANT CHANGE UP (ref 0–6)
HCT VFR BLD CALC: 37.9 % — SIGNIFICANT CHANGE UP (ref 34.5–45)
HGB BLD-MCNC: 12.7 G/DL — SIGNIFICANT CHANGE UP (ref 11.5–15.5)
IMM GRANULOCYTES NFR BLD AUTO: 0.3 % — SIGNIFICANT CHANGE UP (ref 0–1.5)
LYMPHOCYTES # BLD AUTO: 1.44 K/UL — SIGNIFICANT CHANGE UP (ref 1–3.3)
LYMPHOCYTES # BLD AUTO: 44 % — SIGNIFICANT CHANGE UP (ref 13–44)
MCHC RBC-ENTMCNC: 30.2 PG — SIGNIFICANT CHANGE UP (ref 27–34)
MCHC RBC-ENTMCNC: 33.5 G/DL — SIGNIFICANT CHANGE UP (ref 32–36)
MCV RBC AUTO: 90 FL — SIGNIFICANT CHANGE UP (ref 80–100)
MONOCYTES # BLD AUTO: 0.26 K/UL — SIGNIFICANT CHANGE UP (ref 0–0.9)
MONOCYTES NFR BLD AUTO: 8 % — SIGNIFICANT CHANGE UP (ref 2–14)
NEUTROPHILS # BLD AUTO: 1.49 K/UL — LOW (ref 1.8–7.4)
NEUTROPHILS NFR BLD AUTO: 45.6 % — SIGNIFICANT CHANGE UP (ref 43–77)
NRBC # BLD: 0 /100 WBCS — SIGNIFICANT CHANGE UP (ref 0–0)
PLATELET # BLD AUTO: 201 K/UL — SIGNIFICANT CHANGE UP (ref 150–400)
RBC # BLD: 4.21 M/UL — SIGNIFICANT CHANGE UP (ref 3.8–5.2)
RBC # FLD: 12.9 % — SIGNIFICANT CHANGE UP (ref 10.3–14.5)
WBC # BLD: 3.27 K/UL — LOW (ref 3.8–10.5)
WBC # FLD AUTO: 3.27 K/UL — LOW (ref 3.8–10.5)

## 2021-01-06 ENCOUNTER — OUTPATIENT (OUTPATIENT)
Dept: OUTPATIENT SERVICES | Facility: HOSPITAL | Age: 46
LOS: 1 days | End: 2021-01-06

## 2021-01-06 VITALS
OXYGEN SATURATION: 98 % | DIASTOLIC BLOOD PRESSURE: 80 MMHG | HEART RATE: 70 BPM | HEIGHT: 63 IN | WEIGHT: 179.9 LBS | SYSTOLIC BLOOD PRESSURE: 100 MMHG | TEMPERATURE: 98 F | RESPIRATION RATE: 18 BRPM

## 2021-01-06 DIAGNOSIS — Z90.79 ACQUIRED ABSENCE OF OTHER GENITAL ORGAN(S): Chronic | ICD-10-CM

## 2021-01-06 DIAGNOSIS — Z92.21 PERSONAL HISTORY OF ANTINEOPLASTIC CHEMOTHERAPY: Chronic | ICD-10-CM

## 2021-01-06 DIAGNOSIS — Z98.890 OTHER SPECIFIED POSTPROCEDURAL STATES: Chronic | ICD-10-CM

## 2021-01-06 DIAGNOSIS — C85.90 NON-HODGKIN LYMPHOMA, UNSPECIFIED, UNSPECIFIED SITE: ICD-10-CM

## 2021-01-06 DIAGNOSIS — Z90.49 ACQUIRED ABSENCE OF OTHER SPECIFIED PARTS OF DIGESTIVE TRACT: Chronic | ICD-10-CM

## 2021-01-06 DIAGNOSIS — Z90.721 ACQUIRED ABSENCE OF OVARIES, UNILATERAL: Chronic | ICD-10-CM

## 2021-01-06 RX ORDER — SODIUM CHLORIDE 9 MG/ML
1000 INJECTION, SOLUTION INTRAVENOUS
Refills: 0 | Status: DISCONTINUED | OUTPATIENT
Start: 2021-01-13 | End: 2021-01-27

## 2021-01-06 RX ORDER — SODIUM CHLORIDE 9 MG/ML
3 INJECTION INTRAMUSCULAR; INTRAVENOUS; SUBCUTANEOUS EVERY 8 HOURS
Refills: 0 | Status: DISCONTINUED | OUTPATIENT
Start: 2021-01-13 | End: 2021-01-27

## 2021-01-06 NOTE — H&P PST ADULT - VENOUS THROMBOEMBOLISM FOR WOMEN ONLY
(0) indicator not present/(1) history of unexplained stillborn infant, recurrent spontaneous  (3 or more), premature birth with toxemia or growth-restricted infant

## 2021-01-06 NOTE — H&P PST ADULT - NEGATIVE NEUROLOGICAL SYMPTOMS
no transient paralysis/no weakness/no paresthesias/no generalized seizures/no vertigo/no loss of sensation/no difficulty walking/no headache

## 2021-01-06 NOTE — H&P PST ADULT - VENOUS THROMBOEMBOLISM CURRENT STATUS
Mao Schafer's daughter, Charline, called regarding a home care order. She was under the impression that it was going to be placed at the time of the last appointment on 5/5. No order in epic. Discussed with HILARIA More. Order placed for palliative RN home care, PT, OT, wound care.    (2) malignancy (present or previous)

## 2021-01-06 NOTE — H&P PST ADULT - NEGATIVE GASTROINTESTINAL SYMPTOMS
no nausea/no vomiting/no diarrhea/no constipation/no abdominal pain/no melena/no jaundice/no hiccoughs

## 2021-01-06 NOTE — H&P PST ADULT - HISTORY OF PRESENT ILLNESS
42 yo female with  46 y/o female with pre op diagnosis: Non-Hodgkin lymphoma presents to PST for pre op evaluation in preparation for removal of Mediport on 01/13/2021

## 2021-01-06 NOTE — H&P PST ADULT - NEGATIVE ENMT SYMPTOMS
no hearing difficulty/no ear pain/no tinnitus/no vertigo/no sinus symptoms/no nasal congestion/no nasal discharge/no dry mouth/no dysphagia Patient/Parent(s)

## 2021-01-06 NOTE — H&P PST ADULT - NEGATIVE MUSCULOSKELETAL SYMPTOMS
no arthralgia/no joint swelling/no myalgia/no muscle cramps/no muscle weakness/no stiffness/no neck pain/no arm pain L/no arm pain R/no back pain/no leg pain L

## 2021-01-06 NOTE — H&P PST ADULT - NSICDXPASTSURGICALHX_GEN_ALL_CORE_FT
PAST SURGICAL HISTORY:  H/O dilation and curettage     History of appendectomy     History of cholecystectomy      PAST SURGICAL HISTORY:  H/O dilation and curettage     History of appendectomy     History of chemotherapy Mediport insertion;  04/03/2018    History of cholecystectomy     S/P right oophorectomy 04/2018    Status post bilateral salpingectomy 04/2018

## 2021-01-06 NOTE — H&P PST ADULT - RS GEN PE MLT RESP DETAILS PC
Continue Flonase
airway patent/breath sounds equal/good air movement/respirations non-labored/clear to auscultation bilaterally/no chest wall tenderness/no intercostal retractions/no rales/no rhonchi/no subcutaneous emphysema/no wheezes

## 2021-01-06 NOTE — H&P PST ADULT - NSICDXPROBLEM_GEN_ALL_CORE_FT
PROBLEM DIAGNOSES  Problem: Non Hodgkin's lymphoma  Assessment and Plan: Scheduled for removal of Mediport on 01/13/2021. Pre op instructions, famotidine, chlorhexidine gluconate soap given and explained. Pt verbalized understanding.  Pt confirmed scheduled appt for Covid-19 test pre op

## 2021-01-06 NOTE — H&P PST ADULT - NSICDXPASTMEDICALHX_GEN_ALL_CORE_FT
PAST MEDICAL HISTORY:  Lymphoma      (normal spontaneous vaginal delivery) H/O    Unspecified condition associated with female genital organs and menstrual cycle      PAST MEDICAL HISTORY:  History of chemotherapy     Lymphoma      (normal spontaneous vaginal delivery) H/O    Unspecified condition associated with female genital organs and menstrual cycle

## 2021-01-10 ENCOUNTER — APPOINTMENT (OUTPATIENT)
Dept: DISASTER EMERGENCY | Facility: CLINIC | Age: 46
End: 2021-01-10

## 2021-01-11 LAB — SARS-COV-2 N GENE NPH QL NAA+PROBE: NOT DETECTED

## 2021-01-12 NOTE — ASU PATIENT PROFILE, ADULT - PMH
History of chemotherapy    Lymphoma     (normal spontaneous vaginal delivery)  H/O  Unspecified condition associated with female genital organs and menstrual cycle

## 2021-01-12 NOTE — ASU PATIENT PROFILE, ADULT - PSH
H/O dilation and curettage    History of appendectomy    History of chemotherapy  Mediport insertion;  04/03/2018  History of cholecystectomy    S/P right oophorectomy  04/2018  Status post bilateral salpingectomy  04/2018

## 2021-01-13 ENCOUNTER — OUTPATIENT (OUTPATIENT)
Dept: OUTPATIENT SERVICES | Facility: HOSPITAL | Age: 46
LOS: 1 days | Discharge: HOME | End: 2021-01-13
Payer: MEDICAID

## 2021-01-13 ENCOUNTER — APPOINTMENT (OUTPATIENT)
Dept: THORACIC SURGERY | Facility: HOSPITAL | Age: 46
End: 2021-01-13

## 2021-01-13 VITALS
DIASTOLIC BLOOD PRESSURE: 58 MMHG | SYSTOLIC BLOOD PRESSURE: 101 MMHG | OXYGEN SATURATION: 100 % | RESPIRATION RATE: 16 BRPM | HEART RATE: 56 BPM

## 2021-01-13 VITALS
HEART RATE: 71 BPM | SYSTOLIC BLOOD PRESSURE: 118 MMHG | OXYGEN SATURATION: 100 % | TEMPERATURE: 98 F | HEIGHT: 63 IN | DIASTOLIC BLOOD PRESSURE: 79 MMHG | WEIGHT: 179.9 LBS | RESPIRATION RATE: 14 BRPM

## 2021-01-13 DIAGNOSIS — C85.90 NON-HODGKIN LYMPHOMA, UNSPECIFIED, UNSPECIFIED SITE: ICD-10-CM

## 2021-01-13 DIAGNOSIS — Z90.721 ACQUIRED ABSENCE OF OVARIES, UNILATERAL: Chronic | ICD-10-CM

## 2021-01-13 DIAGNOSIS — Z98.890 OTHER SPECIFIED POSTPROCEDURAL STATES: Chronic | ICD-10-CM

## 2021-01-13 DIAGNOSIS — Z90.49 ACQUIRED ABSENCE OF OTHER SPECIFIED PARTS OF DIGESTIVE TRACT: Chronic | ICD-10-CM

## 2021-01-13 DIAGNOSIS — Z92.21 PERSONAL HISTORY OF ANTINEOPLASTIC CHEMOTHERAPY: Chronic | ICD-10-CM

## 2021-01-13 DIAGNOSIS — Z90.79 ACQUIRED ABSENCE OF OTHER GENITAL ORGAN(S): Chronic | ICD-10-CM

## 2021-01-13 PROCEDURE — 36590 REMOVAL TUNNELED CV CATH: CPT

## 2021-01-13 RX ORDER — ACETAMINOPHEN 500 MG
1000 TABLET ORAL ONCE
Refills: 0 | Status: DISCONTINUED | OUTPATIENT
Start: 2021-01-13 | End: 2021-01-27

## 2021-01-13 NOTE — ASU DISCHARGE PLAN (ADULT/PEDIATRIC) - ASU DC SPECIAL INSTRUCTIONSFT
FOLLOW-UP:  1. Please call to make a follow-up appointment within two weeks of discharge with Dr. Fulton (See below for office information to call for an appointment)    WOUND CARE: Dressing may be removed in 48 hours and the steri strips underneath will be removed in the office or fall off on their own. You may shower immediately, as long as you do not get the incision wet. You may shower and get the incision wet after 48 hours. Do not submerge the incision for 7 days.

## 2021-01-13 NOTE — ASU DISCHARGE PLAN (ADULT/PEDIATRIC) - CALL YOUR DOCTOR IF YOU HAVE ANY OF THE FOLLOWING:
Bleeding that does not stop Bleeding that does not stop/Swelling that gets worse/Pain not relieved by Medications/Fever greater than (need to indicate Fahrenheit or Celsius)/Wound/Surgical Site with redness, or foul smelling discharge or pus/Nausea and vomiting that does not stop

## 2021-01-13 NOTE — ASU PREOP CHECKLIST - PATIENT SENT TO
Atenolol: Last time prescribed: 10/2/18 , 90 tabs x 3 refills   Pravastatin: Last time prescribed: 10/2/18 , 90 tabs x 3 refills  Last office visit: 10/2/18  Next appointment:10/8/19 - annual exam    Medication is being filled for 1 time refill only due to:  due for labs and appointment, keep upcoming office visit     Rhea Naylor RN  09/19/19  2:31 PM    
operating room

## 2021-01-13 NOTE — ASU DISCHARGE PLAN (ADULT/PEDIATRIC) - CARE PROVIDER_API CALL
Ray Fulton)  Surgery; Thoracic Surgery  [address] [place] [address], [place] [address]  Phone: [phone]  Fax: [phone]  Follow Up Time:

## 2021-01-19 ENCOUNTER — APPOINTMENT (OUTPATIENT)
Dept: ORTHOPEDIC SURGERY | Facility: CLINIC | Age: 46
End: 2021-01-19
Payer: COMMERCIAL

## 2021-01-19 ENCOUNTER — OUTPATIENT (OUTPATIENT)
Dept: OUTPATIENT SERVICES | Facility: HOSPITAL | Age: 46
LOS: 1 days | Discharge: HOME | End: 2021-01-19

## 2021-01-19 DIAGNOSIS — Z90.79 ACQUIRED ABSENCE OF OTHER GENITAL ORGAN(S): Chronic | ICD-10-CM

## 2021-01-19 DIAGNOSIS — Z90.49 ACQUIRED ABSENCE OF OTHER SPECIFIED PARTS OF DIGESTIVE TRACT: Chronic | ICD-10-CM

## 2021-01-19 DIAGNOSIS — C85.90 NON-HODGKIN LYMPHOMA, UNSPECIFIED, UNSPECIFIED SITE: ICD-10-CM

## 2021-01-19 DIAGNOSIS — M22.2X2 PATELLOFEMORAL DISORDERS, LEFT KNEE: ICD-10-CM

## 2021-01-19 DIAGNOSIS — Z98.890 OTHER SPECIFIED POSTPROCEDURAL STATES: Chronic | ICD-10-CM

## 2021-01-19 DIAGNOSIS — Z92.21 PERSONAL HISTORY OF ANTINEOPLASTIC CHEMOTHERAPY: Chronic | ICD-10-CM

## 2021-01-19 DIAGNOSIS — Z90.721 ACQUIRED ABSENCE OF OVARIES, UNILATERAL: Chronic | ICD-10-CM

## 2021-01-19 PROCEDURE — 73562 X-RAY EXAM OF KNEE 3: CPT | Mod: LT

## 2021-01-19 PROCEDURE — 99072 ADDL SUPL MATRL&STAF TM PHE: CPT

## 2021-01-19 PROCEDURE — 99214 OFFICE O/P EST MOD 30 MIN: CPT

## 2021-01-19 NOTE — PHYSICAL EXAM
[de-identified] : Constitutional: Well-nourished, well-developed, No acute distress\par Respiratory:  Good respiratory effort, no SOB\par Lymphatic: No regional lymphadenopathy, no lymphedema\par Psychiatric: Pleasant and normal affect, alert and oriented x3\par Skin: Clean dry and intact B/L UE/LE\par Musculoskeletal: normal except where as noted in regional exam\par \par B/L Hips: No asymmetry, malalignment, or swelling, Full ROM, 5/5 strength in flexion/ext, IR/ER, Abd/Add, Joints stable\par B/L Ankles: No asymmetry, malalignment, or swelling, Full ROM, 5/5 strength in DF/PF/Inv/Ev, Joints stable\par \par Left Knee:\par APPEARANCE: no marked deformities, no swelling or malalignment\par POSITIVE TENDERNESS:  + crepitus of the anterior knee, and tenderness of patellar retinaculum\par NONTENDER: jt lines b/l, patellar & quadriceps tendons, MCL/LCL, ITB at the lateral femoral condyle & Gerdy's tubercle, pes bursa. \par ROM: full & painless, although some discomfort in deep knee flexion\par RESISTIVE TESTING: + discomfort with knee ext from deep knee flexion (stretched position), painless knee flexion. \par SPECIAL TESTS: stable v/v stress. painless grind. neg Lachman's. neg ant/post drawer. neg Tiana's. neg Thessaly test. neg Brandy's & Malacrae's\par NEURO: Normal sensation of LE, DTRs 2+/4 patella and achilles\par PULSES: 2+ DP/PT pulses\par  [de-identified] : \par The following radiographs were ordered and read by me during this patient's visit. I reviewed each radiograph in detail with the patient and discussed the findings as highlighted below. \par \par 3 views of the left knee were obtained today that show no fracture, or dislocation. There are no degenerative changes seen. There is no malalignment. No obvious osseous abnormality. Otherwise unremarkable.

## 2021-01-19 NOTE — DISCUSSION/SUMMARY
[de-identified] : Discussed findings of today's exam and possible causes of patient's pain.  Educated patient on their most probable diagnosis of atraumatic left knee pain due to patellofemoral pain syndrome.  Reviewed possible courses of treatment, and we collaboratively decided best course of treatment at this time will include conservative management.  Patient started on a course of oral NSAIDs, prescription given for diclofenac (We discussed all possible side effects of this medication).  Patient will be started on a course of physical therapy to restore normal range of motion and strength as tolerated.  Patient was concerned about this pain as she has prior right knee pain which subsequently led to discovery of a distal femur tumor, I advised the patient there is no evidence of bony abnormality or tumor on x-ray today.  Follow up as needed.  Patient appreciates and agrees with current plan.\par \par This note was generated using dragon medical dictation software.  A reasonable effort has been made for proofreading its contents, but typos may still remain.  If there are any questions or points of clarification needed please notify my office.\par

## 2021-01-19 NOTE — HISTORY OF PRESENT ILLNESS
[de-identified] : Patient is here for left knee pain that began about a week ago. There was no inciting injury. She has difficulty bending her knee and states that it feels swollen. She has used essential oils to treat it without relief and has tried nothing else. She works as . She does not exercise.

## 2021-01-19 NOTE — REASON FOR VISIT
[Initial Visit] : an initial visit for [Spouse] : spouse [Family Member] : family member [FreeTextEntry2] : left knee pain

## 2021-01-25 ENCOUNTER — APPOINTMENT (OUTPATIENT)
Dept: INFUSION THERAPY | Facility: HOSPITAL | Age: 46
End: 2021-01-25

## 2021-01-26 ENCOUNTER — APPOINTMENT (OUTPATIENT)
Dept: ORTHOPEDIC SURGERY | Facility: CLINIC | Age: 46
End: 2021-01-26

## 2021-02-10 ENCOUNTER — RX RENEWAL (OUTPATIENT)
Age: 46
End: 2021-02-10

## 2021-02-10 RX ORDER — DICLOFENAC SODIUM 50 MG/1
50 TABLET, DELAYED RELEASE ORAL
Qty: 60 | Refills: 0 | Status: ACTIVE | COMMUNITY
Start: 2021-01-19 | End: 1900-01-01

## 2021-03-02 ENCOUNTER — OUTPATIENT (OUTPATIENT)
Dept: OUTPATIENT SERVICES | Facility: HOSPITAL | Age: 46
LOS: 1 days | Discharge: HOME | End: 2021-03-02

## 2021-03-02 DIAGNOSIS — Z92.21 PERSONAL HISTORY OF ANTINEOPLASTIC CHEMOTHERAPY: Chronic | ICD-10-CM

## 2021-03-02 DIAGNOSIS — C85.90 NON-HODGKIN LYMPHOMA, UNSPECIFIED, UNSPECIFIED SITE: ICD-10-CM

## 2021-03-02 DIAGNOSIS — Z90.721 ACQUIRED ABSENCE OF OVARIES, UNILATERAL: Chronic | ICD-10-CM

## 2021-03-02 DIAGNOSIS — Z98.890 OTHER SPECIFIED POSTPROCEDURAL STATES: Chronic | ICD-10-CM

## 2021-03-02 DIAGNOSIS — Z90.79 ACQUIRED ABSENCE OF OTHER GENITAL ORGAN(S): Chronic | ICD-10-CM

## 2021-03-02 DIAGNOSIS — Z90.49 ACQUIRED ABSENCE OF OTHER SPECIFIED PARTS OF DIGESTIVE TRACT: Chronic | ICD-10-CM

## 2021-03-06 ENCOUNTER — RESULT REVIEW (OUTPATIENT)
Age: 46
End: 2021-03-06

## 2021-03-08 ENCOUNTER — APPOINTMENT (OUTPATIENT)
Dept: INFUSION THERAPY | Facility: HOSPITAL | Age: 46
End: 2021-03-08

## 2021-03-15 ENCOUNTER — APPOINTMENT (OUTPATIENT)
Dept: CT IMAGING | Facility: IMAGING CENTER | Age: 46
End: 2021-03-15
Payer: COMMERCIAL

## 2021-03-15 ENCOUNTER — OUTPATIENT (OUTPATIENT)
Dept: OUTPATIENT SERVICES | Facility: HOSPITAL | Age: 46
LOS: 1 days | End: 2021-03-15
Payer: MEDICAID

## 2021-03-15 DIAGNOSIS — Z90.49 ACQUIRED ABSENCE OF OTHER SPECIFIED PARTS OF DIGESTIVE TRACT: Chronic | ICD-10-CM

## 2021-03-15 DIAGNOSIS — Z98.890 OTHER SPECIFIED POSTPROCEDURAL STATES: Chronic | ICD-10-CM

## 2021-03-15 DIAGNOSIS — Z92.21 PERSONAL HISTORY OF ANTINEOPLASTIC CHEMOTHERAPY: Chronic | ICD-10-CM

## 2021-03-15 DIAGNOSIS — C83.39 DIFFUSE LARGE B-CELL LYMPHOMA, EXTRANODAL AND SOLID ORGAN SITES: ICD-10-CM

## 2021-03-15 DIAGNOSIS — Z90.721 ACQUIRED ABSENCE OF OVARIES, UNILATERAL: Chronic | ICD-10-CM

## 2021-03-15 DIAGNOSIS — Z90.79 ACQUIRED ABSENCE OF OTHER GENITAL ORGAN(S): Chronic | ICD-10-CM

## 2021-03-15 PROCEDURE — 74177 CT ABD & PELVIS W/CONTRAST: CPT

## 2021-03-15 PROCEDURE — 71260 CT THORAX DX C+: CPT

## 2021-03-15 PROCEDURE — 74177 CT ABD & PELVIS W/CONTRAST: CPT | Mod: 26

## 2021-03-15 PROCEDURE — 71260 CT THORAX DX C+: CPT | Mod: 26

## 2021-03-22 ENCOUNTER — RESULT REVIEW (OUTPATIENT)
Age: 46
End: 2021-03-22

## 2021-03-22 ENCOUNTER — APPOINTMENT (OUTPATIENT)
Dept: HEMATOLOGY ONCOLOGY | Facility: CLINIC | Age: 46
End: 2021-03-22
Payer: COMMERCIAL

## 2021-03-22 VITALS
RESPIRATION RATE: 18 BRPM | WEIGHT: 176.47 LBS | OXYGEN SATURATION: 97 % | BODY MASS INDEX: 31.27 KG/M2 | TEMPERATURE: 97.3 F | HEART RATE: 77 BPM | DIASTOLIC BLOOD PRESSURE: 80 MMHG | SYSTOLIC BLOOD PRESSURE: 120 MMHG | HEIGHT: 62.99 IN

## 2021-03-22 PROCEDURE — 99072 ADDL SUPL MATRL&STAF TM PHE: CPT

## 2021-03-22 PROCEDURE — 99213 OFFICE O/P EST LOW 20 MIN: CPT

## 2021-03-22 NOTE — HISTORY OF PRESENT ILLNESS
[de-identified] : 44yo F here for management of newly diagnosed DLBCL. \par \par Pt developed pain in Right knee in 2/2017 as well as some heaviness, swelling, and difficulty walking secondary to pain. Pt had imaging done including an Xray on 4/3/17 which was unremarkable, MRI of the Right knee on 5/30/17 showing nonaggressive appearing enhancing foci within the distal femur and proximal tibia, and a bone scan on 6/6/17 which showed no osteoblastic reactive pathology with focal uptake in the left lateral tibial plateau. Pain resolved spontaneously within a few weeks. \par \par In 2/2018, she had recurrent pain in Right knee. Xray done of right femur on 3/12/18 showed lucent lesion in the distal femur. This was followed by an MRI on 3/8/18 showing a large destructive bone lesion in the dorsum of the distal right femoral metaphysis that is suspicious for malignancy. Had PET done 3/25/18 which showed hypermetabolic lytic lesion right distal femur (SUV max of 13), slight uptake lateral condyle left femur, subcentimeter left popliteal hypermetabolic lymph node w/ max SUV of 5, hypermetabolic mass 4.8 x 3.7 cm right adnexa with an exophytic photopenic cyst with a max SUV of 17.6. 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 Asencio at [facility]. Had echo done 4/6 showing EF of 54%. She was prescribed allopurinol but hasn't started yet. \par Labs from 4/6 showed a slightly elevated ALT/AST of 111/39 with normal bilirubin. Cr 0.63, coags wnl. Total HCG from 3/27 <1.2.\par Hepatitis Bc Ab nonreactive, Hepatitis Bs Ag non reactive, Hepatitis Bs Ab <3, Hepatitis C Ab non reactive, HIV non reactive from 3/27/18.  [de-identified] : She had portacath placed on 4/13/18. \par C1 R-CHOP on 4/16/18. \par s/p LP 4/16, CSF negative for malignant cells\par She reported severe nausea after C1, added d2 and 3 emend. She also had severe hemorrhoids that were causing her pain. Saw GI and surgery, wants to hold off on surgery at this time. Currently improved.\par She is seeing Dr. Wheatley now for ortho  \par C2 on 5/7/18. Not nauseous like last time. Hemorrhoids improved. \par C3 on 5/30/18 (b/c of [facility]). This cycle she has more nausea, more fatigue. Has a headache as well. She took d2 and d3 Emend but has not taken zofran or reglan at home.\par \par PET/CT 6/18/18: 1. Resolution of FDG-avid right adnexal mass as compared to prior study dated 3/15/2018.\par 2. Interval decrease in metabolism associated with a lytic lesion in distal right femur which appears more lucent as compared to prior study. Findings are compatible with response to interval therapy (Deauville 3).\par 3. Resolution of subcentimeter FDG-avid left popliteal lymph node.     \par 4. No new lesion. \par \par C4 on 6/20/18. Saw Dr. Wheatley and Dr. French. She is doing well. \par C5 on 7/11/18. LP held last 2 cycles b/c of symptoms and then insurance issues. \par C6 on 8/1/18. LP also done. Tolerated well. \par \par MRI 9/7/18: MR findings compatible with clinical history of lymphoma of the right distal femoral metadiaphysis with slight interval decreased size and significantly decreased enhancement and perilesional edema. Associated secondary foci of abnormal bone marrow T2 hyperintensity and enhancement in the right distal femoral diaphysis and proximal tibial metaphysis, stable as compared to prior. \par PET 9/12/18: 1. Minimally FDG-avid lytic lesion in distal right femur, not significantly changed as compared to prior study dated 6/18/2018, probably represents treated disease (Deauville 3). \par 2. Nonspecific rectal hypermetabolism, increased as compared to prior study. Please correlate clinically and with proctoscopy, as indicated.\par 3. Study is otherwise unchanged, demonstrating no evidence of recurrent lymphoma.\par \par s/p biopsy of lytic bone lesion showed no operative tumor. \par \par CT C/A/P 3/4/19: No evidence of recurrent disease\par MRI 3/5/19: 1.8 x 3.3 x 4.4 cm lesion within the central aspect of the distal femoral metaphysis extending into the posterior roof of the intercondylar notch and breaching the posterior medial metaphyseal cortex. The lesion is grossly similar in size when compared to the prior examination, although appears much more organized, given the lack of perilesional edema which has markedly improved from the prior examination. The previously seen lesions within the proximal aspect of the central tibia are markedly decreased in conspicuity from the prior exam and indiscernible on today's examination. No pathologic fracture. No new lesions. \par \par CT C/A/P 9/3/19: No significant lymphadenopathy. No significant interval change since 3/4/2019.\par \par CT 2/24/20: no lymphadenopathy in the chest, abdomen or pelvis. \par MRI femur 3/5/20: Marked interval reduction in size of previously seen distal femoral lesion. There is a small amount of residual high T2 signal within this region which is favored to represent posttreatment change or avascular necrosis over residual lymphoma. \par \par CT 8/31/20: Stable exam.\par No evidence of suspicious mass or lymphadenopathy in the chest, abdomen, or pelvis.\par MRI knee 8/31/20: Signal abnormality at the right distal femoral metaphysis posteriorly which is consistent with a treated or partially treated lesion and less likely bone infarction.\par \par Doing well. Last visit, c/o R elbow pain and swelling down R forearm x 10 days. Denies any trauma. Symptoms self resolved\par \par CT C/A/P: stable appearance of the chest, abdomen, and pelvis.

## 2021-03-22 NOTE — ASSESSMENT
[FreeTextEntry1] : 44yo F here for management of newly diagnosed DLBCL of femur and right ovary. C1 R-CHOP on 4/16/18.\par s/p C6 on 8/1/18. PET done 9/12/18 showed minimally avid lesion in distal right femur probably represents treated disease. s/p biopsy of lytic bone lesion showing no operable tumor\par \par -cont q6mo visits with labs, scans only as clinically needed. Last scan done in 3/2021 -CR\par -port has been removed\par -f/u w/ Dr. Wheatley as scheduled, MRI due in 8/2021\par -RTC 6 mo

## 2021-03-24 LAB
ALBUMIN SERPL ELPH-MCNC: 4.9 G/DL
ALP BLD-CCNC: 47 U/L
ALT SERPL-CCNC: 18 U/L
ANION GAP SERPL CALC-SCNC: 11 MMOL/L
AST SERPL-CCNC: 21 U/L
BILIRUB SERPL-MCNC: 0.5 MG/DL
BUN SERPL-MCNC: 10 MG/DL
CALCIUM SERPL-MCNC: 10.2 MG/DL
CHLORIDE SERPL-SCNC: 104 MMOL/L
CO2 SERPL-SCNC: 27 MMOL/L
CREAT SERPL-MCNC: 0.68 MG/DL
GLUCOSE SERPL-MCNC: 87 MG/DL
LDH SERPL-CCNC: 175 U/L
POTASSIUM SERPL-SCNC: 4 MMOL/L
PROT SERPL-MCNC: 7.6 G/DL
SODIUM SERPL-SCNC: 142 MMOL/L

## 2021-05-11 NOTE — ASU PREOP CHECKLIST - TO WHOM
Decreased amlodipine to 5 mg tablet daily(use what you have then new dose will be at pharmacy)  Call with blood pressures in 2 weeks  6 month follow up with PA        Thank you for attending your Cardiology appointment today. We aim to make your appointment as positive as possible.    We hope that your questions have been answered and you understand any plans that have been discussed. If you have any questions or concerns please call us at your convenience at [phone] (ask to speak with Cardiology), this number will connect you directly with our nursing staff.    You may receive a survey about your experience with us today. If you cannot give us the highest score possible for each question, we would appreciate any feedback you can give us on how we can do better in the future.    Please finalize the following details regarding your follow up visit:    1. Date and location of your follow up visit. If you would like to schedule your upcoming appointment, please contact our Department at [phone]. We will be happy to address your questions and concerns.    2. Contact information. Please take a card with our contact information should you have any questions or concerns.     3. [facility] is now [facility]! Go to [email or web address] to sign up. Send messages to your doctor, view your test results, renew your prescriptions and more!    Thank You.    Wilmar DAVIS MD  [address] [place] [address] [phone]  
Jessica

## 2021-07-06 ENCOUNTER — APPOINTMENT (OUTPATIENT)
Dept: ORTHOPEDIC SURGERY | Facility: CLINIC | Age: 46
End: 2021-07-06
Payer: MEDICAID

## 2021-07-06 ENCOUNTER — APPOINTMENT (OUTPATIENT)
Dept: ORTHOPEDIC SURGERY | Facility: CLINIC | Age: 46
End: 2021-07-06

## 2021-07-06 PROCEDURE — 73560 X-RAY EXAM OF KNEE 1 OR 2: CPT | Mod: 50

## 2021-07-06 PROCEDURE — 99213 OFFICE O/P EST LOW 20 MIN: CPT

## 2021-07-07 NOTE — PHYSICAL EXAM
[FreeTextEntry1] : Physical exam revealed a healthy looking patient in no apparent distress patient appears to be fully alert oriented having no complaints no pain examination of the right leg demonstrate full range of motion no swelling no palpable mass no gross neurovascular deficit.  New plain x-ray of both knees demonstrate sclerotic changes over the distal femur without any significant active disease at this stage patient was recommended to be followed and to be seen again in 9 months with a new plain x-ray otherwise patient was recommended to continue to be followed by her medical oncologist

## 2021-07-07 NOTE — HISTORY OF PRESENT ILLNESS
[FreeTextEntry1] : This is a 46 years old female with a previous medical history of malignant lymphoma involving the right distal femur patient previously received chemotherapy and at this stage several years later patient remained disease free in a complete remission having no significant complain return to a full level of activity.

## 2021-07-30 ENCOUNTER — OUTPATIENT (OUTPATIENT)
Dept: OUTPATIENT SERVICES | Facility: HOSPITAL | Age: 46
LOS: 1 days | Discharge: HOME | End: 2021-07-30

## 2021-07-30 DIAGNOSIS — Z92.21 PERSONAL HISTORY OF ANTINEOPLASTIC CHEMOTHERAPY: Chronic | ICD-10-CM

## 2021-07-30 DIAGNOSIS — Z90.49 ACQUIRED ABSENCE OF OTHER SPECIFIED PARTS OF DIGESTIVE TRACT: Chronic | ICD-10-CM

## 2021-07-30 DIAGNOSIS — Z90.721 ACQUIRED ABSENCE OF OVARIES, UNILATERAL: Chronic | ICD-10-CM

## 2021-07-30 DIAGNOSIS — Z90.79 ACQUIRED ABSENCE OF OTHER GENITAL ORGAN(S): Chronic | ICD-10-CM

## 2021-07-30 DIAGNOSIS — Z98.890 OTHER SPECIFIED POSTPROCEDURAL STATES: Chronic | ICD-10-CM

## 2021-07-30 DIAGNOSIS — C85.90 NON-HODGKIN LYMPHOMA, UNSPECIFIED, UNSPECIFIED SITE: ICD-10-CM

## 2021-08-02 ENCOUNTER — RESULT REVIEW (OUTPATIENT)
Age: 46
End: 2021-08-02

## 2021-08-02 ENCOUNTER — APPOINTMENT (OUTPATIENT)
Dept: HEMATOLOGY ONCOLOGY | Facility: CLINIC | Age: 46
End: 2021-08-02
Payer: MEDICAID

## 2021-08-02 VITALS
BODY MASS INDEX: 30.27 KG/M2 | DIASTOLIC BLOOD PRESSURE: 74 MMHG | RESPIRATION RATE: 18 BRPM | WEIGHT: 170.86 LBS | TEMPERATURE: 97.3 F | SYSTOLIC BLOOD PRESSURE: 103 MMHG | HEIGHT: 63 IN | HEART RATE: 79 BPM | OXYGEN SATURATION: 99 %

## 2021-08-02 LAB
BASOPHILS # BLD AUTO: 0.09 K/UL — SIGNIFICANT CHANGE UP (ref 0–0.2)
BASOPHILS NFR BLD AUTO: 2 % — SIGNIFICANT CHANGE UP (ref 0–2)
DACRYOCYTES BLD QL SMEAR: SLIGHT — SIGNIFICANT CHANGE UP
EOSINOPHIL # BLD AUTO: 0.05 K/UL — SIGNIFICANT CHANGE UP (ref 0–0.5)
EOSINOPHIL NFR BLD AUTO: 1 % — SIGNIFICANT CHANGE UP (ref 0–6)
HCT VFR BLD CALC: 42.5 % — SIGNIFICANT CHANGE UP (ref 34.5–45)
HGB BLD-MCNC: 13.6 G/DL — SIGNIFICANT CHANGE UP (ref 11.5–15.5)
LYMPHOCYTES # BLD AUTO: 1.65 K/UL — SIGNIFICANT CHANGE UP (ref 1–3.3)
LYMPHOCYTES # BLD AUTO: 35 % — SIGNIFICANT CHANGE UP (ref 13–44)
MCHC RBC-ENTMCNC: 30 PG — SIGNIFICANT CHANGE UP (ref 27–34)
MCHC RBC-ENTMCNC: 32 G/DL — SIGNIFICANT CHANGE UP (ref 32–36)
MCV RBC AUTO: 93.8 FL — SIGNIFICANT CHANGE UP (ref 80–100)
MONOCYTES # BLD AUTO: 0.33 K/UL — SIGNIFICANT CHANGE UP (ref 0–0.9)
MONOCYTES NFR BLD AUTO: 7 % — SIGNIFICANT CHANGE UP (ref 2–14)
NEUTROPHILS # BLD AUTO: 2.59 K/UL — SIGNIFICANT CHANGE UP (ref 1.8–7.4)
NEUTROPHILS NFR BLD AUTO: 55 % — SIGNIFICANT CHANGE UP (ref 43–77)
NRBC # BLD: 0 /100 — SIGNIFICANT CHANGE UP (ref 0–0)
NRBC # BLD: SIGNIFICANT CHANGE UP /100 WBCS (ref 0–0)
PLAT MORPH BLD: NORMAL — SIGNIFICANT CHANGE UP
PLATELET # BLD AUTO: 250 K/UL — SIGNIFICANT CHANGE UP (ref 150–400)
POIKILOCYTOSIS BLD QL AUTO: SLIGHT — SIGNIFICANT CHANGE UP
RBC # BLD: 4.53 M/UL — SIGNIFICANT CHANGE UP (ref 3.8–5.2)
RBC # FLD: 13.1 % — SIGNIFICANT CHANGE UP (ref 10.3–14.5)
RBC BLD AUTO: ABNORMAL
WBC # BLD: 4.7 K/UL — SIGNIFICANT CHANGE UP (ref 3.8–10.5)
WBC # FLD AUTO: 4.7 K/UL — SIGNIFICANT CHANGE UP (ref 3.8–10.5)

## 2021-08-02 PROCEDURE — 99213 OFFICE O/P EST LOW 20 MIN: CPT

## 2021-08-02 NOTE — HISTORY OF PRESENT ILLNESS
[de-identified] : 44yo F here for management of newly diagnosed DLBCL. \par \par Pt developed pain in Right knee in 2/2017 as well as some heaviness, swelling, and difficulty walking secondary to pain. Pt had imaging done including an Xray on 4/3/17 which was unremarkable, MRI of the Right knee on 5/30/17 showing nonaggressive appearing enhancing foci within the distal femur and proximal tibia, and a bone scan on 6/6/17 which showed no osteoblastic reactive pathology with focal uptake in the left lateral tibial plateau. Pain resolved spontaneously within a few weeks. \par \par In 2/2018, she had recurrent pain in Right knee. Xray done of right femur on 3/12/18 showed lucent lesion in the distal femur. This was followed by an MRI on 3/8/18 showing a large destructive bone lesion in the dorsum of the distal right femoral metaphysis that is suspicious for malignancy. Had PET done 3/25/18 which showed hypermetabolic lytic lesion right distal femur (SUV max of 13), slight uptake lateral condyle left femur, subcentimeter left popliteal hypermetabolic lymph node w/ max SUV of 5, hypermetabolic mass 4.8 x 3.7 cm right adnexa with an exophytic photopenic cyst with a max SUV of 17.6. 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 Asencio at [facility]. Had echo done 4/6 showing EF of 54%. She was prescribed allopurinol but hasn't started yet. \par Labs from 4/6 showed a slightly elevated ALT/AST of 111/39 with normal bilirubin. Cr 0.63, coags wnl. Total HCG from 3/27 <1.2.\par Hepatitis Bc Ab nonreactive, Hepatitis Bs Ag non reactive, Hepatitis Bs Ab <3, Hepatitis C Ab non reactive, HIV non reactive from 3/27/18.  [de-identified] : She had portacath placed on 4/13/18. \par C1 R-CHOP on 4/16/18. \par s/p LP 4/16, CSF negative for malignant cells\par She reported severe nausea after C1, added d2 and 3 emend. She also had severe hemorrhoids that were causing her pain. Saw GI and surgery, wants to hold off on surgery at this time. Currently improved.\par She is seeing Dr. Wheatley now for ortho  \par C2 on 5/7/18. Not nauseous like last time. Hemorrhoids improved. \par C3 on 5/30/18 (b/c of [facility]). This cycle she has more nausea, more fatigue. Has a headache as well. She took d2 and d3 Emend but has not taken zofran or reglan at home.\par \par PET/CT 6/18/18: 1. Resolution of FDG-avid right adnexal mass as compared to prior study dated 3/15/2018.\par 2. Interval decrease in metabolism associated with a lytic lesion in distal right femur which appears more lucent as compared to prior study. Findings are compatible with response to interval therapy (Deauville 3).\par 3. Resolution of subcentimeter FDG-avid left popliteal lymph node.     \par 4. No new lesion. \par \par C4 on 6/20/18. Saw Dr. Wheatley and Dr. French. She is doing well. \par C5 on 7/11/18. LP held last 2 cycles b/c of symptoms and then insurance issues. \par C6 on 8/1/18. LP also done. Tolerated well. \par \par MRI 9/7/18: MR findings compatible with clinical history of lymphoma of the right distal femoral metadiaphysis with slight interval decreased size and significantly decreased enhancement and perilesional edema. Associated secondary foci of abnormal bone marrow T2 hyperintensity and enhancement in the right distal femoral diaphysis and proximal tibial metaphysis, stable as compared to prior. \par PET 9/12/18: 1. Minimally FDG-avid lytic lesion in distal right femur, not significantly changed as compared to prior study dated 6/18/2018, probably represents treated disease (Deauville 3). \par 2. Nonspecific rectal hypermetabolism, increased as compared to prior study. Please correlate clinically and with proctoscopy, as indicated.\par 3. Study is otherwise unchanged, demonstrating no evidence of recurrent lymphoma.\par \par s/p biopsy of lytic bone lesion showed no operative tumor. \par \par CT C/A/P 3/4/19: No evidence of recurrent disease\par MRI 3/5/19: 1.8 x 3.3 x 4.4 cm lesion within the central aspect of the distal femoral metaphysis extending into the posterior roof of the intercondylar notch and breaching the posterior medial metaphyseal cortex. The lesion is grossly similar in size when compared to the prior examination, although appears much more organized, given the lack of perilesional edema which has markedly improved from the prior examination. The previously seen lesions within the proximal aspect of the central tibia are markedly decreased in conspicuity from the prior exam and indiscernible on today's examination. No pathologic fracture. No new lesions. \par \par CT C/A/P 9/3/19: No significant lymphadenopathy. No significant interval change since 3/4/2019.\par \par CT 2/24/20: no lymphadenopathy in the chest, abdomen or pelvis. \par MRI femur 3/5/20: Marked interval reduction in size of previously seen distal femoral lesion. There is a small amount of residual high T2 signal within this region which is favored to represent posttreatment change or avascular necrosis over residual lymphoma. \par \par CT 8/31/20: Stable exam.\par No evidence of suspicious mass or lymphadenopathy in the chest, abdomen, or pelvis.\par MRI knee 8/31/20: Signal abnormality at the right distal femoral metaphysis posteriorly which is consistent with a treated or partially treated lesion and less likely bone infarction.\par \par Doing well. Last visit, c/o R elbow pain and swelling down R forearm x 10 days. Denies any trauma. Symptoms self resolved\par \par CT C/A/P 3/15/21: stable appearance of the chest, abdomen, and pelvis. \par \par She both her COVID vaccine doses -second on 5/16/21\par Saw Dr. Wheatley 7/6/21 - Xrays done without any significant active disease.

## 2021-08-02 NOTE — ASSESSMENT
[FreeTextEntry1] : 44yo F here for management of newly diagnosed DLBCL of femur and right ovary. C1 R-CHOP on 4/16/18.\par s/p C6 on 8/1/18. PET done 9/12/18 showed minimally avid lesion in distal right femur probably represents treated disease. s/p biopsy of lytic bone lesion showing no operable tumor\par \par -cont q6mo visits with labs, scans only as clinically needed. Last scan done in 3/2021 -CR\par -port has been removed\par -f/u w/ Dr. Wheatley as scheduled\par -RTC 6 mo

## 2021-08-09 LAB
ALBUMIN SERPL ELPH-MCNC: 5 G/DL
ALP BLD-CCNC: 51 U/L
ALT SERPL-CCNC: 13 U/L
ANION GAP SERPL CALC-SCNC: 12 MMOL/L
AST SERPL-CCNC: 22 U/L
BILIRUB SERPL-MCNC: 0.3 MG/DL
BUN SERPL-MCNC: 14 MG/DL
CALCIUM SERPL-MCNC: 10.2 MG/DL
CHLORIDE SERPL-SCNC: 103 MMOL/L
CO2 SERPL-SCNC: 25 MMOL/L
CREAT SERPL-MCNC: 0.94 MG/DL
GLUCOSE SERPL-MCNC: 65 MG/DL
LDH SERPL-CCNC: 179 U/L
POTASSIUM SERPL-SCNC: 4.3 MMOL/L
PROT SERPL-MCNC: 7.8 G/DL
SODIUM SERPL-SCNC: 140 MMOL/L

## 2022-01-10 NOTE — ASU PREOP CHECKLIST - ORDERS/MEDICATION ADMINISTRATION RECORD ON CHART
-S/P DT HM3 with MVR 2/2/20  -Current speed 5300  -INR goal 1.5-2.0 (hx of ICH). INR trending back up at 2.3 despite holding Coumadin (got Vit K 1 mg IV on 1/6 for INR 3.0)  -LDH stable  -ECHO 10/9/21 with EF 20%, BRYAN, IVS bows into RV, G1DD, s/p Alferi stitch, mild-moderate RVSF, CVP 3, LVEDD 6.43 with LVAD speed set to 5300 rpm      Procedure: Device Interrogation Including analysis of device parameters  Current Settings: Ventricular Assist Device  Review of device function is stable      TXP LVAD INTERROGATIONS 1/10/2022 1/10/2022 1/10/2022 1/9/2022 1/9/2022 1/9/2022 1/9/2022   Type HeartMate3 HeartMate3 HeartMate3 HeartMate3 HeartMate3 HeartMate3 HeartMate3   Flow 4.8 4.8 4.8 4.8 4.9 4.7 4.7   Speed 5300 5300 5300 5300 5300 5300 5300   PI 3.4 3.3 3.3 2.9 2.4 3.5 2.9   Power (Centeno) 3.9 3.8 3.8 3.8 3.8 3.8 3.8   LSL 4900 - - 4900 4900 4900 4900   Pulsatility No Pulse No Pulse No Pulse No Pulse - - -      done

## 2022-02-04 ENCOUNTER — OUTPATIENT (OUTPATIENT)
Dept: OUTPATIENT SERVICES | Facility: HOSPITAL | Age: 47
LOS: 1 days | Discharge: HOME | End: 2022-02-04

## 2022-02-04 DIAGNOSIS — Z92.21 PERSONAL HISTORY OF ANTINEOPLASTIC CHEMOTHERAPY: Chronic | ICD-10-CM

## 2022-02-04 DIAGNOSIS — Z90.49 ACQUIRED ABSENCE OF OTHER SPECIFIED PARTS OF DIGESTIVE TRACT: Chronic | ICD-10-CM

## 2022-02-04 DIAGNOSIS — Z90.721 ACQUIRED ABSENCE OF OVARIES, UNILATERAL: Chronic | ICD-10-CM

## 2022-02-04 DIAGNOSIS — C85.90 NON-HODGKIN LYMPHOMA, UNSPECIFIED, UNSPECIFIED SITE: ICD-10-CM

## 2022-02-04 DIAGNOSIS — Z90.79 ACQUIRED ABSENCE OF OTHER GENITAL ORGAN(S): Chronic | ICD-10-CM

## 2022-02-04 DIAGNOSIS — Z98.890 OTHER SPECIFIED POSTPROCEDURAL STATES: Chronic | ICD-10-CM

## 2022-02-07 ENCOUNTER — RESULT REVIEW (OUTPATIENT)
Age: 47
End: 2022-02-07

## 2022-02-07 ENCOUNTER — APPOINTMENT (OUTPATIENT)
Dept: HEMATOLOGY ONCOLOGY | Facility: CLINIC | Age: 47
End: 2022-02-07
Payer: MEDICAID

## 2022-02-07 VITALS
HEIGHT: 63.27 IN | SYSTOLIC BLOOD PRESSURE: 112 MMHG | DIASTOLIC BLOOD PRESSURE: 78 MMHG | HEART RATE: 64 BPM | BODY MASS INDEX: 31.51 KG/M2 | TEMPERATURE: 97.3 F | OXYGEN SATURATION: 99 % | WEIGHT: 180.1 LBS | RESPIRATION RATE: 16 BRPM

## 2022-02-07 LAB
BASOPHILS # BLD AUTO: 0.03 K/UL — SIGNIFICANT CHANGE UP (ref 0–0.2)
BASOPHILS NFR BLD AUTO: 0.9 % — SIGNIFICANT CHANGE UP (ref 0–2)
EOSINOPHIL # BLD AUTO: 0.06 K/UL — SIGNIFICANT CHANGE UP (ref 0–0.5)
EOSINOPHIL NFR BLD AUTO: 1.7 % — SIGNIFICANT CHANGE UP (ref 0–6)
HCT VFR BLD CALC: 42.2 % — SIGNIFICANT CHANGE UP (ref 34.5–45)
HGB BLD-MCNC: 13.6 G/DL — SIGNIFICANT CHANGE UP (ref 11.5–15.5)
IMM GRANULOCYTES NFR BLD AUTO: 0.3 % — SIGNIFICANT CHANGE UP (ref 0–1.5)
LYMPHOCYTES # BLD AUTO: 1.52 K/UL — SIGNIFICANT CHANGE UP (ref 1–3.3)
LYMPHOCYTES # BLD AUTO: 44.3 % — HIGH (ref 13–44)
MCHC RBC-ENTMCNC: 29.9 PG — SIGNIFICANT CHANGE UP (ref 27–34)
MCHC RBC-ENTMCNC: 32.2 G/DL — SIGNIFICANT CHANGE UP (ref 32–36)
MCV RBC AUTO: 92.7 FL — SIGNIFICANT CHANGE UP (ref 80–100)
MONOCYTES # BLD AUTO: 0.29 K/UL — SIGNIFICANT CHANGE UP (ref 0–0.9)
MONOCYTES NFR BLD AUTO: 8.5 % — SIGNIFICANT CHANGE UP (ref 2–14)
NEUTROPHILS # BLD AUTO: 1.52 K/UL — LOW (ref 1.8–7.4)
NEUTROPHILS NFR BLD AUTO: 44.3 % — SIGNIFICANT CHANGE UP (ref 43–77)
NRBC # BLD: 0 /100 WBCS — SIGNIFICANT CHANGE UP (ref 0–0)
PLATELET # BLD AUTO: 217 K/UL — SIGNIFICANT CHANGE UP (ref 150–400)
RBC # BLD: 4.55 M/UL — SIGNIFICANT CHANGE UP (ref 3.8–5.2)
RBC # FLD: 12.6 % — SIGNIFICANT CHANGE UP (ref 10.3–14.5)
WBC # BLD: 3.43 K/UL — LOW (ref 3.8–10.5)
WBC # FLD AUTO: 3.43 K/UL — LOW (ref 3.8–10.5)

## 2022-02-07 PROCEDURE — 99213 OFFICE O/P EST LOW 20 MIN: CPT

## 2022-02-07 NOTE — ASSESSMENT
[FreeTextEntry1] : 45yo F here for management of newly diagnosed DLBCL of femur and right ovary. C1 R-CHOP on 4/16/18.\par s/p C6 on 8/1/18. PET done 9/12/18 showed minimally avid lesion in distal right femur probably represents treated disease. s/p biopsy of lytic bone lesion showing no operable tumor\par \par -cont q6mo visits with labs, scans only as clinically needed. Last scan done in 3/2021 -CR\par -port has been removed\par -f/u w/ Dr. Wheatley as scheduled\par -pt due for mammo\par -RTC 6 mo

## 2022-02-07 NOTE — HISTORY OF PRESENT ILLNESS
[de-identified] : 47yo F here for management of newly diagnosed DLBCL. \par \par Pt developed pain in Right knee in 2/2017 as well as some heaviness, swelling, and difficulty walking secondary to pain. Pt had imaging done including an Xray on 4/3/17 which was unremarkable, MRI of the Right knee on 5/30/17 showing nonaggressive appearing enhancing foci within the distal femur and proximal tibia, and a bone scan on 6/6/17 which showed no osteoblastic reactive pathology with focal uptake in the left lateral tibial plateau. Pain resolved spontaneously within a few weeks. \par \par In 2/2018, she had recurrent pain in Right knee. Xray done of right femur on 3/12/18 showed lucent lesion in the distal femur. This was followed by an MRI on 3/8/18 showing a large destructive bone lesion in the dorsum of the distal right femoral metaphysis that is suspicious for malignancy. Had PET done 3/25/18 which showed hypermetabolic lytic lesion right distal femur (SUV max of 13), slight uptake lateral condyle left femur, subcentimeter left popliteal hypermetabolic lymph node w/ max SUV of 5, hypermetabolic mass 4.8 x 3.7 cm right adnexa with an exophytic photopenic cyst with a max SUV of 17.6. 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 Asencio at [facility]. Had echo done 4/6 showing EF of 54%. She was prescribed allopurinol but hasn't started yet. \par Labs from 4/6 showed a slightly elevated ALT/AST of 111/39 with normal bilirubin. Cr 0.63, coags wnl. Total HCG from 3/27 <1.2.\par Hepatitis Bc Ab nonreactive, Hepatitis Bs Ag non reactive, Hepatitis Bs Ab <3, Hepatitis C Ab non reactive, HIV non reactive from 3/27/18.  [de-identified] : She had portacath placed on 4/13/18. \par C1 R-CHOP on 4/16/18. \par s/p LP 4/16, CSF negative for malignant cells\par She reported severe nausea after C1, added d2 and 3 emend. She also had severe hemorrhoids that were causing her pain. Saw GI and surgery, wants to hold off on surgery at this time. Currently improved.\par She is seeing Dr. Wheatley now for ortho  \par C2 on 5/7/18. Not nauseous like last time. Hemorrhoids improved. \par C3 on 5/30/18 (b/c of [facility]). This cycle she has more nausea, more fatigue. Has a headache as well. She took d2 and d3 Emend but has not taken zofran or reglan at home.\par \par PET/CT 6/18/18: 1. Resolution of FDG-avid right adnexal mass as compared to prior study dated 3/15/2018.\par 2. Interval decrease in metabolism associated with a lytic lesion in distal right femur which appears more lucent as compared to prior study. Findings are compatible with response to interval therapy (Deauville 3).\par 3. Resolution of subcentimeter FDG-avid left popliteal lymph node.     \par 4. No new lesion. \par \par C4 on 6/20/18. Saw Dr. Wheatley and Dr. French. She is doing well. \par C5 on 7/11/18. LP held last 2 cycles b/c of symptoms and then insurance issues. \par C6 on 8/1/18. LP also done. Tolerated well. \par \par MRI 9/7/18: MR findings compatible with clinical history of lymphoma of the right distal femoral metadiaphysis with slight interval decreased size and significantly decreased enhancement and perilesional edema. Associated secondary foci of abnormal bone marrow T2 hyperintensity and enhancement in the right distal femoral diaphysis and proximal tibial metaphysis, stable as compared to prior. \par PET 9/12/18: 1. Minimally FDG-avid lytic lesion in distal right femur, not significantly changed as compared to prior study dated 6/18/2018, probably represents treated disease (Deauville 3). \par 2. Nonspecific rectal hypermetabolism, increased as compared to prior study. Please correlate clinically and with proctoscopy, as indicated.\par 3. Study is otherwise unchanged, demonstrating no evidence of recurrent lymphoma.\par \par s/p biopsy of lytic bone lesion showed no operative tumor. \par \par CT C/A/P 3/4/19: No evidence of recurrent disease\par MRI 3/5/19: 1.8 x 3.3 x 4.4 cm lesion within the central aspect of the distal femoral metaphysis extending into the posterior roof of the intercondylar notch and breaching the posterior medial metaphyseal cortex. The lesion is grossly similar in size when compared to the prior examination, although appears much more organized, given the lack of perilesional edema which has markedly improved from the prior examination. The previously seen lesions within the proximal aspect of the central tibia are markedly decreased in conspicuity from the prior exam and indiscernible on today's examination. No pathologic fracture. No new lesions. \par \par CT C/A/P 9/3/19: No significant lymphadenopathy. No significant interval change since 3/4/2019.\par \par CT 2/24/20: no lymphadenopathy in the chest, abdomen or pelvis. \par MRI femur 3/5/20: Marked interval reduction in size of previously seen distal femoral lesion. There is a small amount of residual high T2 signal within this region which is favored to represent posttreatment change or avascular necrosis over residual lymphoma. \par \par CT 8/31/20: Stable exam.\par No evidence of suspicious mass or lymphadenopathy in the chest, abdomen, or pelvis.\par MRI knee 8/31/20: Signal abnormality at the right distal femoral metaphysis posteriorly which is consistent with a treated or partially treated lesion and less likely bone infarction.\par \par Doing well. Last visit, c/o R elbow pain and swelling down R forearm x 10 days. Denies any trauma. Symptoms self resolved\par \par CT C/A/P 3/15/21: stable appearance of the chest, abdomen, and pelvis. \par \par She both her COVID vaccine doses -second on 5/16/21\par Saw Dr. Wheatley 7/6/21 - Xrays done without any significant active disease. \par Has not had booster yet

## 2022-02-09 LAB
ALBUMIN SERPL ELPH-MCNC: 4.8 G/DL
ALP BLD-CCNC: 45 U/L
ALT SERPL-CCNC: 20 U/L
ANION GAP SERPL CALC-SCNC: 10 MMOL/L
AST SERPL-CCNC: 24 U/L
BILIRUB SERPL-MCNC: 0.7 MG/DL
BUN SERPL-MCNC: 8 MG/DL
CALCIUM SERPL-MCNC: 10.2 MG/DL
CHLORIDE SERPL-SCNC: 104 MMOL/L
CO2 SERPL-SCNC: 26 MMOL/L
CREAT SERPL-MCNC: 0.71 MG/DL
GLUCOSE SERPL-MCNC: 96 MG/DL
LDH SERPL-CCNC: 172 U/L
POTASSIUM SERPL-SCNC: 4.6 MMOL/L
PROT SERPL-MCNC: 7.4 G/DL
SODIUM SERPL-SCNC: 140 MMOL/L

## 2022-04-06 NOTE — BEGINNING OF VISIT
Patient arrives with wife with c/o SOB, bilateral leg edema, and weakness for a few weeks. Patient's wife states that 1.5 weeks he had an US done on his legs to rule out a blood clot but nothing ha improved since that visit.    [Patient] : patient

## 2022-07-05 ENCOUNTER — OUTPATIENT (OUTPATIENT)
Dept: OUTPATIENT SERVICES | Facility: HOSPITAL | Age: 47
LOS: 1 days | End: 2022-07-05
Payer: MEDICAID

## 2022-07-05 ENCOUNTER — APPOINTMENT (OUTPATIENT)
Dept: MAMMOGRAPHY | Facility: IMAGING CENTER | Age: 47
End: 2022-07-05

## 2022-07-05 ENCOUNTER — APPOINTMENT (OUTPATIENT)
Dept: ULTRASOUND IMAGING | Facility: IMAGING CENTER | Age: 47
End: 2022-07-05

## 2022-07-05 DIAGNOSIS — Z98.890 OTHER SPECIFIED POSTPROCEDURAL STATES: Chronic | ICD-10-CM

## 2022-07-05 DIAGNOSIS — Z90.79 ACQUIRED ABSENCE OF OTHER GENITAL ORGAN(S): Chronic | ICD-10-CM

## 2022-07-05 DIAGNOSIS — Z90.49 ACQUIRED ABSENCE OF OTHER SPECIFIED PARTS OF DIGESTIVE TRACT: Chronic | ICD-10-CM

## 2022-07-05 DIAGNOSIS — Z92.21 PERSONAL HISTORY OF ANTINEOPLASTIC CHEMOTHERAPY: Chronic | ICD-10-CM

## 2022-07-05 DIAGNOSIS — Z90.721 ACQUIRED ABSENCE OF OVARIES, UNILATERAL: Chronic | ICD-10-CM

## 2022-07-05 DIAGNOSIS — Z00.8 ENCOUNTER FOR OTHER GENERAL EXAMINATION: ICD-10-CM

## 2022-07-05 PROCEDURE — 77063 BREAST TOMOSYNTHESIS BI: CPT | Mod: 26

## 2022-07-05 PROCEDURE — 76641 ULTRASOUND BREAST COMPLETE: CPT

## 2022-07-05 PROCEDURE — 77063 BREAST TOMOSYNTHESIS BI: CPT

## 2022-07-05 PROCEDURE — 77067 SCR MAMMO BI INCL CAD: CPT

## 2022-07-05 PROCEDURE — 76641 ULTRASOUND BREAST COMPLETE: CPT | Mod: 26,50

## 2022-07-05 PROCEDURE — 77067 SCR MAMMO BI INCL CAD: CPT | Mod: 26

## 2022-07-15 DIAGNOSIS — M79.602 PAIN IN LEFT ARM: ICD-10-CM

## 2022-07-18 ENCOUNTER — OUTPATIENT (OUTPATIENT)
Dept: OUTPATIENT SERVICES | Facility: HOSPITAL | Age: 47
LOS: 1 days | End: 2022-07-18
Payer: MEDICAID

## 2022-07-18 ENCOUNTER — APPOINTMENT (OUTPATIENT)
Dept: ULTRASOUND IMAGING | Facility: IMAGING CENTER | Age: 47
End: 2022-07-18

## 2022-07-18 DIAGNOSIS — Z98.890 OTHER SPECIFIED POSTPROCEDURAL STATES: Chronic | ICD-10-CM

## 2022-07-18 DIAGNOSIS — Z90.49 ACQUIRED ABSENCE OF OTHER SPECIFIED PARTS OF DIGESTIVE TRACT: Chronic | ICD-10-CM

## 2022-07-18 DIAGNOSIS — Z90.721 ACQUIRED ABSENCE OF OVARIES, UNILATERAL: Chronic | ICD-10-CM

## 2022-07-18 DIAGNOSIS — M79.602 PAIN IN LEFT ARM: ICD-10-CM

## 2022-07-18 DIAGNOSIS — Z90.79 ACQUIRED ABSENCE OF OTHER GENITAL ORGAN(S): Chronic | ICD-10-CM

## 2022-07-18 DIAGNOSIS — Z92.21 PERSONAL HISTORY OF ANTINEOPLASTIC CHEMOTHERAPY: Chronic | ICD-10-CM

## 2022-07-18 PROCEDURE — 93971 EXTREMITY STUDY: CPT

## 2022-07-18 PROCEDURE — 93971 EXTREMITY STUDY: CPT | Mod: 26,RT

## 2022-07-28 ENCOUNTER — OUTPATIENT (OUTPATIENT)
Dept: OUTPATIENT SERVICES | Facility: HOSPITAL | Age: 47
LOS: 1 days | Discharge: HOME | End: 2022-07-28

## 2022-07-28 DIAGNOSIS — Z90.721 ACQUIRED ABSENCE OF OVARIES, UNILATERAL: Chronic | ICD-10-CM

## 2022-07-28 DIAGNOSIS — Z92.21 PERSONAL HISTORY OF ANTINEOPLASTIC CHEMOTHERAPY: Chronic | ICD-10-CM

## 2022-07-28 DIAGNOSIS — Z98.890 OTHER SPECIFIED POSTPROCEDURAL STATES: Chronic | ICD-10-CM

## 2022-07-28 DIAGNOSIS — Z90.79 ACQUIRED ABSENCE OF OTHER GENITAL ORGAN(S): Chronic | ICD-10-CM

## 2022-07-28 DIAGNOSIS — Z90.49 ACQUIRED ABSENCE OF OTHER SPECIFIED PARTS OF DIGESTIVE TRACT: Chronic | ICD-10-CM

## 2022-07-28 DIAGNOSIS — C85.90 NON-HODGKIN LYMPHOMA, UNSPECIFIED, UNSPECIFIED SITE: ICD-10-CM

## 2022-08-29 ENCOUNTER — APPOINTMENT (OUTPATIENT)
Dept: HEMATOLOGY ONCOLOGY | Facility: CLINIC | Age: 47
End: 2022-08-29

## 2022-08-29 ENCOUNTER — RESULT REVIEW (OUTPATIENT)
Age: 47
End: 2022-08-29

## 2022-08-29 VITALS
BODY MASS INDEX: 29.04 KG/M2 | OXYGEN SATURATION: 98 % | RESPIRATION RATE: 16 BRPM | SYSTOLIC BLOOD PRESSURE: 128 MMHG | WEIGHT: 165.34 LBS | DIASTOLIC BLOOD PRESSURE: 80 MMHG | HEART RATE: 71 BPM | TEMPERATURE: 97.1 F

## 2022-08-29 LAB
BASOPHILS # BLD AUTO: 0.04 K/UL — SIGNIFICANT CHANGE UP (ref 0–0.2)
BASOPHILS NFR BLD AUTO: 0.8 % — SIGNIFICANT CHANGE UP (ref 0–2)
EOSINOPHIL # BLD AUTO: 0.24 K/UL — SIGNIFICANT CHANGE UP (ref 0–0.5)
EOSINOPHIL NFR BLD AUTO: 5.1 % — SIGNIFICANT CHANGE UP (ref 0–6)
HCT VFR BLD CALC: 39.3 % — SIGNIFICANT CHANGE UP (ref 34.5–45)
HGB BLD-MCNC: 12.8 G/DL — SIGNIFICANT CHANGE UP (ref 11.5–15.5)
IMM GRANULOCYTES NFR BLD AUTO: 0.2 % — SIGNIFICANT CHANGE UP (ref 0–1.5)
LYMPHOCYTES # BLD AUTO: 1.93 K/UL — SIGNIFICANT CHANGE UP (ref 1–3.3)
LYMPHOCYTES # BLD AUTO: 40.6 % — SIGNIFICANT CHANGE UP (ref 13–44)
MCHC RBC-ENTMCNC: 30.3 PG — SIGNIFICANT CHANGE UP (ref 27–34)
MCHC RBC-ENTMCNC: 32.6 G/DL — SIGNIFICANT CHANGE UP (ref 32–36)
MCV RBC AUTO: 92.9 FL — SIGNIFICANT CHANGE UP (ref 80–100)
MONOCYTES # BLD AUTO: 0.36 K/UL — SIGNIFICANT CHANGE UP (ref 0–0.9)
MONOCYTES NFR BLD AUTO: 7.6 % — SIGNIFICANT CHANGE UP (ref 2–14)
NEUTROPHILS # BLD AUTO: 2.17 K/UL — SIGNIFICANT CHANGE UP (ref 1.8–7.4)
NEUTROPHILS NFR BLD AUTO: 45.7 % — SIGNIFICANT CHANGE UP (ref 43–77)
NRBC # BLD: 0 /100 WBCS — SIGNIFICANT CHANGE UP (ref 0–0)
PLATELET # BLD AUTO: 229 K/UL — SIGNIFICANT CHANGE UP (ref 150–400)
RBC # BLD: 4.23 M/UL — SIGNIFICANT CHANGE UP (ref 3.8–5.2)
RBC # FLD: 13.1 % — SIGNIFICANT CHANGE UP (ref 10.3–14.5)
WBC # BLD: 4.75 K/UL — SIGNIFICANT CHANGE UP (ref 3.8–10.5)
WBC # FLD AUTO: 4.75 K/UL — SIGNIFICANT CHANGE UP (ref 3.8–10.5)

## 2022-08-29 PROCEDURE — 99213 OFFICE O/P EST LOW 20 MIN: CPT

## 2022-08-29 NOTE — HISTORY OF PRESENT ILLNESS
[de-identified] : 45yo F here for management of newly diagnosed DLBCL. \par \par Pt developed pain in Right knee in 2/2017 as well as some heaviness, swelling, and difficulty walking secondary to pain. Pt had imaging done including an Xray on 4/3/17 which was unremarkable, MRI of the Right knee on 5/30/17 showing nonaggressive appearing enhancing foci within the distal femur and proximal tibia, and a bone scan on 6/6/17 which showed no osteoblastic reactive pathology with focal uptake in the left lateral tibial plateau. Pain resolved spontaneously within a few weeks. \par \par In 2/2018, she had recurrent pain in Right knee. Xray done of right femur on 3/12/18 showed lucent lesion in the distal femur. This was followed by an MRI on 3/8/18 showing a large destructive bone lesion in the dorsum of the distal right femoral metaphysis that is suspicious for malignancy. Had PET done 3/25/18 which showed hypermetabolic lytic lesion right distal femur (SUV max of 13), slight uptake lateral condyle left femur, subcentimeter left popliteal hypermetabolic lymph node w/ max SUV of 5, hypermetabolic mass 4.8 x 3.7 cm right adnexa with an exophytic photopenic cyst with a max SUV of 17.6. 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 Asencio at [facility]. Had echo done 4/6 showing EF of 54%. She was prescribed allopurinol but hasn't started yet. \par Labs from 4/6 showed a slightly elevated ALT/AST of 111/39 with normal bilirubin. Cr 0.63, coags wnl. Total HCG from 3/27 <1.2.\par Hepatitis Bc Ab nonreactive, Hepatitis Bs Ag non reactive, Hepatitis Bs Ab <3, Hepatitis C Ab non reactive, HIV non reactive from 3/27/18.  [de-identified] : She had portacath placed on 4/13/18. \par C1 R-CHOP on 4/16/18. \par s/p LP 4/16, CSF negative for malignant cells\par She reported severe nausea after C1, added d2 and 3 emend. She also had severe hemorrhoids that were causing her pain. Saw GI and surgery, wants to hold off on surgery at this time. Currently improved.\par She is seeing Dr. Wheatley now for ortho  \par C2 on 5/7/18. Not nauseous like last time. Hemorrhoids improved. \par C3 on 5/30/18 (b/c of [facility]). This cycle she has more nausea, more fatigue. Has a headache as well. She took d2 and d3 Emend but has not taken zofran or reglan at home.\par \par PET/CT 6/18/18: 1. Resolution of FDG-avid right adnexal mass as compared to prior study dated 3/15/2018.\par 2. Interval decrease in metabolism associated with a lytic lesion in distal right femur which appears more lucent as compared to prior study. Findings are compatible with response to interval therapy (Deauville 3).\par 3. Resolution of subcentimeter FDG-avid left popliteal lymph node.     \par 4. No new lesion. \par \par C4 on 6/20/18. Saw Dr. Wheatley and Dr. French. She is doing well. \par C5 on 7/11/18. LP held last 2 cycles b/c of symptoms and then insurance issues. \par C6 on 8/1/18. LP also done. Tolerated well. \par \par MRI 9/7/18: MR findings compatible with clinical history of lymphoma of the right distal femoral metadiaphysis with slight interval decreased size and significantly decreased enhancement and perilesional edema. Associated secondary foci of abnormal bone marrow T2 hyperintensity and enhancement in the right distal femoral diaphysis and proximal tibial metaphysis, stable as compared to prior. \par PET 9/12/18: 1. Minimally FDG-avid lytic lesion in distal right femur, not significantly changed as compared to prior study dated 6/18/2018, probably represents treated disease (Deauville 3). \par 2. Nonspecific rectal hypermetabolism, increased as compared to prior study. Please correlate clinically and with proctoscopy, as indicated.\par 3. Study is otherwise unchanged, demonstrating no evidence of recurrent lymphoma.\par \par s/p biopsy of lytic bone lesion showed no operative tumor. \par \par CT C/A/P 3/4/19: No evidence of recurrent disease\par MRI 3/5/19: 1.8 x 3.3 x 4.4 cm lesion within the central aspect of the distal femoral metaphysis extending into the posterior roof of the intercondylar notch and breaching the posterior medial metaphyseal cortex. The lesion is grossly similar in size when compared to the prior examination, although appears much more organized, given the lack of perilesional edema which has markedly improved from the prior examination. The previously seen lesions within the proximal aspect of the central tibia are markedly decreased in conspicuity from the prior exam and indiscernible on today's examination. No pathologic fracture. No new lesions. \par \par CT C/A/P 9/3/19: No significant lymphadenopathy. No significant interval change since 3/4/2019.\par \par CT 2/24/20: no lymphadenopathy in the chest, abdomen or pelvis. \par MRI femur 3/5/20: Marked interval reduction in size of previously seen distal femoral lesion. There is a small amount of residual high T2 signal within this region which is favored to represent posttreatment change or avascular necrosis over residual lymphoma. \par \par CT 8/31/20: Stable exam.\par No evidence of suspicious mass or lymphadenopathy in the chest, abdomen, or pelvis.\par MRI knee 8/31/20: Signal abnormality at the right distal femoral metaphysis posteriorly which is consistent with a treated or partially treated lesion and less likely bone infarction.\par \par Doing well. Last visit, c/o R elbow pain and swelling down R forearm x 10 days. Denies any trauma. Symptoms self resolved\par \par CT C/A/P 3/15/21: stable appearance of the chest, abdomen, and pelvis. \par \par She both her COVID vaccine doses -second on 5/16/21\par Saw Dr. Wheatley 7/6/21 - Xrays done without any significant active disease. \par Has not had booster yet\par \par She had mammogram done 7/2022. Subsequently she developed pain on R arm from axilla around to elbow. Duplex done was negative for RUE DVT. She actually was diagnosed with shingles afterwards in that dermatome. Pain has now resolved.

## 2022-08-29 NOTE — ASSESSMENT
[FreeTextEntry1] : 45yo F here for management of newly diagnosed DLBCL of femur and right ovary. C1 R-CHOP on 4/16/18.\par s/p C6 on 8/1/18. PET done 9/12/18 showed minimally avid lesion in distal right femur probably represents treated disease. s/p biopsy of lytic bone lesion showing no operable tumor\par \par -cont q6mo visits with labs, scans only as clinically needed. Last scan done in 3/2021 -CR\par -port has been removed\par -f/u w/ Dr. Wheatley as scheduled\par -UTD on [facility]\par -RTC 6 mo

## 2022-08-30 LAB
ALBUMIN SERPL ELPH-MCNC: 5 G/DL
ALP BLD-CCNC: 45 U/L
ALT SERPL-CCNC: 16 U/L
ANION GAP SERPL CALC-SCNC: 12 MMOL/L
AST SERPL-CCNC: 22 U/L
BILIRUB SERPL-MCNC: 0.5 MG/DL
BUN SERPL-MCNC: 8 MG/DL
CALCIUM SERPL-MCNC: 10.3 MG/DL
CHLORIDE SERPL-SCNC: 105 MMOL/L
CO2 SERPL-SCNC: 26 MMOL/L
CREAT SERPL-MCNC: 0.68 MG/DL
EGFR: 109 ML/MIN/1.73M2
GLUCOSE SERPL-MCNC: 86 MG/DL
LDH SERPL-CCNC: 179 U/L
POTASSIUM SERPL-SCNC: 4.2 MMOL/L
PROT SERPL-MCNC: 7.4 G/DL
SODIUM SERPL-SCNC: 143 MMOL/L

## 2022-10-19 NOTE — ASU PATIENT PROFILE, ADULT - MUTUALITY COMMENT, PROFILE
October 19, 2022     Patient: Hemanth Arevalo   YOB: 1999       To Whom it May Concern:    Hemanth Arevalo was seen at my office for a complete physical.     If you have any questions or concerns, please don't hesitate to call.      Sincerely,         Giovanna Jacobsen, DO      Medical information is confidential and cannot be disclosed without the written consent of the patient or her representative.    CC:   No Recipients     none

## 2023-03-15 ENCOUNTER — OUTPATIENT (OUTPATIENT)
Dept: OUTPATIENT SERVICES | Facility: HOSPITAL | Age: 48
LOS: 1 days | Discharge: HOME | End: 2023-03-15

## 2023-03-15 DIAGNOSIS — Z90.79 ACQUIRED ABSENCE OF OTHER GENITAL ORGAN(S): Chronic | ICD-10-CM

## 2023-03-15 DIAGNOSIS — C85.90 NON-HODGKIN LYMPHOMA, UNSPECIFIED, UNSPECIFIED SITE: ICD-10-CM

## 2023-03-15 DIAGNOSIS — Z92.21 PERSONAL HISTORY OF ANTINEOPLASTIC CHEMOTHERAPY: Chronic | ICD-10-CM

## 2023-03-15 DIAGNOSIS — Z90.49 ACQUIRED ABSENCE OF OTHER SPECIFIED PARTS OF DIGESTIVE TRACT: Chronic | ICD-10-CM

## 2023-03-15 DIAGNOSIS — Z90.721 ACQUIRED ABSENCE OF OVARIES, UNILATERAL: Chronic | ICD-10-CM

## 2023-03-15 DIAGNOSIS — Z98.890 OTHER SPECIFIED POSTPROCEDURAL STATES: Chronic | ICD-10-CM

## 2023-03-20 ENCOUNTER — APPOINTMENT (OUTPATIENT)
Dept: HEMATOLOGY ONCOLOGY | Facility: CLINIC | Age: 48
End: 2023-03-20
Payer: MEDICAID

## 2023-03-20 VITALS
DIASTOLIC BLOOD PRESSURE: 77 MMHG | TEMPERATURE: 98 F | WEIGHT: 180.78 LBS | HEART RATE: 88 BPM | OXYGEN SATURATION: 99 % | HEIGHT: 63.27 IN | RESPIRATION RATE: 16 BRPM | SYSTOLIC BLOOD PRESSURE: 113 MMHG | BODY MASS INDEX: 31.63 KG/M2

## 2023-03-20 PROCEDURE — 99213 OFFICE O/P EST LOW 20 MIN: CPT

## 2023-03-20 NOTE — HISTORY OF PRESENT ILLNESS
[de-identified] : 47yo F here for management of newly diagnosed DLBCL. \par \par Pt developed pain in Right knee in 2/2017 as well as some heaviness, swelling, and difficulty walking secondary to pain. Pt had imaging done including an Xray on 4/3/17 which was unremarkable, MRI of the Right knee on 5/30/17 showing nonaggressive appearing enhancing foci within the distal femur and proximal tibia, and a bone scan on 6/6/17 which showed no osteoblastic reactive pathology with focal uptake in the left lateral tibial plateau. Pain resolved spontaneously within a few weeks. \par \par In 2/2018, she had recurrent pain in Right knee. Xray done of right femur on 3/12/18 showed lucent lesion in the distal femur. This was followed by an MRI on 3/8/18 showing a large destructive bone lesion in the dorsum of the distal right femoral metaphysis that is suspicious for malignancy. Had PET done 3/25/18 which showed hypermetabolic lytic lesion right distal femur (SUV max of 13), slight uptake lateral condyle left femur, subcentimeter left popliteal hypermetabolic lymph node w/ max SUV of 5, hypermetabolic mass 4.8 x 3.7 cm right adnexa with an exophytic photopenic cyst with a max SUV of 17.6. 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 Asencio at [facility]. Had echo done 4/6 showing EF of 54%. She was prescribed allopurinol but hasn't started yet. \par Labs from 4/6 showed a slightly elevated ALT/AST of 111/39 with normal bilirubin. Cr 0.63, coags wnl. Total HCG from 3/27 <1.2.\par Hepatitis Bc Ab nonreactive, Hepatitis Bs Ag non reactive, Hepatitis Bs Ab <3, Hepatitis C Ab non reactive, HIV non reactive from 3/27/18.  [de-identified] : She had portacath placed on 4/13/18. \par C1 R-CHOP on 4/16/18. \par s/p LP 4/16, CSF negative for malignant cells\par She reported severe nausea after C1, added d2 and 3 emend. She also had severe hemorrhoids that were causing her pain. Saw GI and surgery, wants to hold off on surgery at this time. Currently improved.\par She is seeing Dr. Wheatley now for ortho  \par C2 on 5/7/18. Not nauseous like last time. Hemorrhoids improved. \par C3 on 5/30/18 (b/c of [facility]). This cycle she has more nausea, more fatigue. Has a headache as well. She took d2 and d3 Emend but has not taken zofran or reglan at home.\par \par PET/CT 6/18/18: 1. Resolution of FDG-avid right adnexal mass as compared to prior study dated 3/15/2018.\par 2. Interval decrease in metabolism associated with a lytic lesion in distal right femur which appears more lucent as compared to prior study. Findings are compatible with response to interval therapy (Deauville 3).\par 3. Resolution of subcentimeter FDG-avid left popliteal lymph node.     \par 4. No new lesion. \par \par C4 on 6/20/18. Saw Dr. Wheatley and Dr. French. She is doing well. \par C5 on 7/11/18. LP held last 2 cycles b/c of symptoms and then insurance issues. \par C6 on 8/1/18. LP also done. Tolerated well. \par \par MRI 9/7/18: MR findings compatible with clinical history of lymphoma of the right distal femoral metadiaphysis with slight interval decreased size and significantly decreased enhancement and perilesional edema. Associated secondary foci of abnormal bone marrow T2 hyperintensity and enhancement in the right distal femoral diaphysis and proximal tibial metaphysis, stable as compared to prior. \par PET 9/12/18: 1. Minimally FDG-avid lytic lesion in distal right femur, not significantly changed as compared to prior study dated 6/18/2018, probably represents treated disease (Deauville 3). \par 2. Nonspecific rectal hypermetabolism, increased as compared to prior study. Please correlate clinically and with proctoscopy, as indicated.\par 3. Study is otherwise unchanged, demonstrating no evidence of recurrent lymphoma.\par \par s/p biopsy of lytic bone lesion showed no operative tumor. \par \par CT C/A/P 3/4/19: No evidence of recurrent disease\par MRI 3/5/19: 1.8 x 3.3 x 4.4 cm lesion within the central aspect of the distal femoral metaphysis extending into the posterior roof of the intercondylar notch and breaching the posterior medial metaphyseal cortex. The lesion is grossly similar in size when compared to the prior examination, although appears much more organized, given the lack of perilesional edema which has markedly improved from the prior examination. The previously seen lesions within the proximal aspect of the central tibia are markedly decreased in conspicuity from the prior exam and indiscernible on today's examination. No pathologic fracture. No new lesions. \par \par CT C/A/P 9/3/19: No significant lymphadenopathy. No significant interval change since 3/4/2019.\par \par CT 2/24/20: no lymphadenopathy in the chest, abdomen or pelvis. \par MRI femur 3/5/20: Marked interval reduction in size of previously seen distal femoral lesion. There is a small amount of residual high T2 signal within this region which is favored to represent posttreatment change or avascular necrosis over residual lymphoma. \par \par CT 8/31/20: Stable exam.\par No evidence of suspicious mass or lymphadenopathy in the chest, abdomen, or pelvis.\par MRI knee 8/31/20: Signal abnormality at the right distal femoral metaphysis posteriorly which is consistent with a treated or partially treated lesion and less likely bone infarction.\par \par Doing well. Last visit, c/o R elbow pain and swelling down R forearm x 10 days. Denies any trauma. Symptoms self resolved\par \par CT C/A/P 3/15/21: stable appearance of the chest, abdomen, and pelvis. \par \par She both her COVID vaccine doses -second on 5/16/21\par Saw Dr. Wheatley 7/6/21 - Xrays done without any significant active disease. \par Has not had booster yet\par \par She had mammogram done 7/2022. Subsequently she developed pain on R arm from axilla around to elbow. Duplex done was negative for RUE DVT. She actually was diagnosed with shingles afterwards in that dermatome. Pain has now resolved.  \par \par Doing well -no new complaints.

## 2023-03-20 NOTE — ASSESSMENT
[FreeTextEntry1] : 45yo F here for management of newly diagnosed DLBCL of femur and right ovary. C1 R-CHOP on 4/16/18.\par s/p C6 on 8/1/18. PET done 9/12/18 showed minimally avid lesion in distal right femur probably represents treated disease. s/p biopsy of lytic bone lesion showing no operable tumor\par \par -cont q6mo visits with labs, scans only as clinically needed. Last scan done in 3/2021 -CR\par -port has been removed\par -f/u w/ Dr. Wheatley as scheduled\par -UTD on [facility]\par -RTC 6 mo

## 2023-04-03 LAB
ALBUMIN SERPL ELPH-MCNC: 5 G/DL
ALP BLD-CCNC: 42 U/L
ALT SERPL-CCNC: 14 U/L
ANION GAP SERPL CALC-SCNC: 11 MMOL/L
AST SERPL-CCNC: 18 U/L
BASOPHILS # BLD AUTO: 0.04 K/UL
BASOPHILS NFR BLD AUTO: 0.9 %
BILIRUB SERPL-MCNC: 0.6 MG/DL
BUN SERPL-MCNC: 14 MG/DL
CALCIUM SERPL-MCNC: 10.3 MG/DL
CHLORIDE SERPL-SCNC: 104 MMOL/L
CO2 SERPL-SCNC: 27 MMOL/L
CREAT SERPL-MCNC: 0.72 MG/DL
EGFR: 104 ML/MIN/1.73M2
EOSINOPHIL # BLD AUTO: 0.32 K/UL
EOSINOPHIL NFR BLD AUTO: 7.5 %
GLUCOSE SERPL-MCNC: 94 MG/DL
HCT VFR BLD CALC: 41.4 %
HGB BLD-MCNC: 13.4 G/DL
IMM GRANULOCYTES NFR BLD AUTO: 0 %
LDH SERPL-CCNC: 160 U/L
LYMPHOCYTES # BLD AUTO: 1.84 K/UL
LYMPHOCYTES NFR BLD AUTO: 43.4 %
MAN DIFF?: NORMAL
MCHC RBC-ENTMCNC: 29.6 PG
MCHC RBC-ENTMCNC: 32.4 GM/DL
MCV RBC AUTO: 91.4 FL
MONOCYTES # BLD AUTO: 0.34 K/UL
MONOCYTES NFR BLD AUTO: 8 %
NEUTROPHILS # BLD AUTO: 1.7 K/UL
NEUTROPHILS NFR BLD AUTO: 40.2 %
PLATELET # BLD AUTO: 244 K/UL
POTASSIUM SERPL-SCNC: 4.7 MMOL/L
PROT SERPL-MCNC: 7.4 G/DL
RBC # BLD: 4.53 M/UL
RBC # FLD: 12.7 %
SODIUM SERPL-SCNC: 142 MMOL/L
WBC # FLD AUTO: 4.24 K/UL

## 2023-04-14 ENCOUNTER — NON-APPOINTMENT (OUTPATIENT)
Age: 48
End: 2023-04-14

## 2023-04-25 ENCOUNTER — APPOINTMENT (OUTPATIENT)
Dept: ORTHOPEDIC SURGERY | Facility: CLINIC | Age: 48
End: 2023-04-25
Payer: MEDICAID

## 2023-04-25 VITALS — WEIGHT: 173 LBS | BODY MASS INDEX: 29.53 KG/M2 | HEIGHT: 64 IN

## 2023-04-25 PROCEDURE — 73564 X-RAY EXAM KNEE 4 OR MORE: CPT | Mod: RT

## 2023-04-25 PROCEDURE — 99213 OFFICE O/P EST LOW 20 MIN: CPT

## 2023-06-19 ENCOUNTER — APPOINTMENT (OUTPATIENT)
Dept: MRI IMAGING | Facility: IMAGING CENTER | Age: 48
End: 2023-06-19
Payer: MEDICAID

## 2023-06-19 ENCOUNTER — OUTPATIENT (OUTPATIENT)
Dept: OUTPATIENT SERVICES | Facility: HOSPITAL | Age: 48
LOS: 1 days | End: 2023-06-19
Payer: MEDICAID

## 2023-06-19 DIAGNOSIS — Z92.21 PERSONAL HISTORY OF ANTINEOPLASTIC CHEMOTHERAPY: Chronic | ICD-10-CM

## 2023-06-19 DIAGNOSIS — C83.39 DIFFUSE LARGE B-CELL LYMPHOMA, EXTRANODAL AND SOLID ORGAN SITES: ICD-10-CM

## 2023-06-19 DIAGNOSIS — Z90.721 ACQUIRED ABSENCE OF OVARIES, UNILATERAL: Chronic | ICD-10-CM

## 2023-06-19 DIAGNOSIS — Z90.49 ACQUIRED ABSENCE OF OTHER SPECIFIED PARTS OF DIGESTIVE TRACT: Chronic | ICD-10-CM

## 2023-06-19 DIAGNOSIS — Z00.8 ENCOUNTER FOR OTHER GENERAL EXAMINATION: ICD-10-CM

## 2023-06-19 DIAGNOSIS — Z90.79 ACQUIRED ABSENCE OF OTHER GENITAL ORGAN(S): Chronic | ICD-10-CM

## 2023-06-19 DIAGNOSIS — Z98.890 OTHER SPECIFIED POSTPROCEDURAL STATES: Chronic | ICD-10-CM

## 2023-06-19 PROCEDURE — A9585: CPT

## 2023-06-19 PROCEDURE — 73723 MRI JOINT LWR EXTR W/O&W/DYE: CPT | Mod: 26,RT

## 2023-06-19 PROCEDURE — 73723 MRI JOINT LWR EXTR W/O&W/DYE: CPT

## 2023-08-24 ENCOUNTER — NON-APPOINTMENT (OUTPATIENT)
Age: 48
End: 2023-08-24

## 2023-11-08 ENCOUNTER — OUTPATIENT (OUTPATIENT)
Dept: OUTPATIENT SERVICES | Facility: HOSPITAL | Age: 48
LOS: 1 days | Discharge: HOME | End: 2023-11-08

## 2023-11-08 DIAGNOSIS — Z98.890 OTHER SPECIFIED POSTPROCEDURAL STATES: Chronic | ICD-10-CM

## 2023-11-08 DIAGNOSIS — C85.90 NON-HODGKIN LYMPHOMA, UNSPECIFIED, UNSPECIFIED SITE: ICD-10-CM

## 2023-11-08 DIAGNOSIS — Z90.79 ACQUIRED ABSENCE OF OTHER GENITAL ORGAN(S): Chronic | ICD-10-CM

## 2023-11-08 DIAGNOSIS — Z92.21 PERSONAL HISTORY OF ANTINEOPLASTIC CHEMOTHERAPY: Chronic | ICD-10-CM

## 2023-11-08 DIAGNOSIS — Z90.721 ACQUIRED ABSENCE OF OVARIES, UNILATERAL: Chronic | ICD-10-CM

## 2023-11-08 DIAGNOSIS — Z90.49 ACQUIRED ABSENCE OF OTHER SPECIFIED PARTS OF DIGESTIVE TRACT: Chronic | ICD-10-CM

## 2023-11-13 ENCOUNTER — APPOINTMENT (OUTPATIENT)
Dept: MAMMOGRAPHY | Facility: CLINIC | Age: 48
End: 2023-11-13

## 2023-11-13 ENCOUNTER — APPOINTMENT (OUTPATIENT)
Dept: ULTRASOUND IMAGING | Facility: CLINIC | Age: 48
End: 2023-11-13

## 2023-11-20 ENCOUNTER — APPOINTMENT (OUTPATIENT)
Dept: ULTRASOUND IMAGING | Facility: CLINIC | Age: 48
End: 2023-11-20
Payer: MEDICAID

## 2023-11-20 ENCOUNTER — APPOINTMENT (OUTPATIENT)
Dept: MAMMOGRAPHY | Facility: CLINIC | Age: 48
End: 2023-11-20
Payer: MEDICAID

## 2023-11-20 ENCOUNTER — APPOINTMENT (OUTPATIENT)
Dept: MRI IMAGING | Facility: CLINIC | Age: 48
End: 2023-11-20

## 2023-11-20 ENCOUNTER — APPOINTMENT (OUTPATIENT)
Dept: MRI IMAGING | Facility: CLINIC | Age: 48
End: 2023-11-20
Payer: MEDICAID

## 2023-11-20 PROCEDURE — 77067 SCR MAMMO BI INCL CAD: CPT

## 2023-11-20 PROCEDURE — A9585: CPT

## 2023-11-20 PROCEDURE — 76641 ULTRASOUND BREAST COMPLETE: CPT | Mod: 50

## 2023-11-20 PROCEDURE — A9585: CPT | Mod: JW

## 2023-11-20 PROCEDURE — 77063 BREAST TOMOSYNTHESIS BI: CPT

## 2023-11-20 PROCEDURE — 72197 MRI PELVIS W/O & W/DYE: CPT

## 2023-11-27 ENCOUNTER — RESULT REVIEW (OUTPATIENT)
Age: 48
End: 2023-11-27

## 2023-11-27 ENCOUNTER — APPOINTMENT (OUTPATIENT)
Dept: HEMATOLOGY ONCOLOGY | Facility: CLINIC | Age: 48
End: 2023-11-27
Payer: MEDICAID

## 2023-11-27 VITALS
RESPIRATION RATE: 16 BRPM | SYSTOLIC BLOOD PRESSURE: 114 MMHG | WEIGHT: 176.37 LBS | TEMPERATURE: 97.4 F | OXYGEN SATURATION: 99 % | DIASTOLIC BLOOD PRESSURE: 82 MMHG | HEART RATE: 66 BPM | BODY MASS INDEX: 30.11 KG/M2 | HEIGHT: 64.02 IN

## 2023-11-27 DIAGNOSIS — C83.39 DIFFUSE LARGE B-CELL LYMPHOMA, EXTRANODAL AND SOLID ORGAN SITES: ICD-10-CM

## 2023-11-27 LAB
BASOPHILS # BLD AUTO: 0.04 K/UL — SIGNIFICANT CHANGE UP (ref 0–0.2)
BASOPHILS # BLD AUTO: 0.04 K/UL — SIGNIFICANT CHANGE UP (ref 0–0.2)
BASOPHILS NFR BLD AUTO: 1 % — SIGNIFICANT CHANGE UP (ref 0–2)
BASOPHILS NFR BLD AUTO: 1 % — SIGNIFICANT CHANGE UP (ref 0–2)
EOSINOPHIL # BLD AUTO: 0.21 K/UL — SIGNIFICANT CHANGE UP (ref 0–0.5)
EOSINOPHIL # BLD AUTO: 0.21 K/UL — SIGNIFICANT CHANGE UP (ref 0–0.5)
EOSINOPHIL NFR BLD AUTO: 5.4 % — SIGNIFICANT CHANGE UP (ref 0–6)
EOSINOPHIL NFR BLD AUTO: 5.4 % — SIGNIFICANT CHANGE UP (ref 0–6)
HCT VFR BLD CALC: 39.6 % — SIGNIFICANT CHANGE UP (ref 34.5–45)
HCT VFR BLD CALC: 39.6 % — SIGNIFICANT CHANGE UP (ref 34.5–45)
HGB BLD-MCNC: 13 G/DL — SIGNIFICANT CHANGE UP (ref 11.5–15.5)
HGB BLD-MCNC: 13 G/DL — SIGNIFICANT CHANGE UP (ref 11.5–15.5)
IMM GRANULOCYTES NFR BLD AUTO: 0 % — SIGNIFICANT CHANGE UP (ref 0–0.9)
IMM GRANULOCYTES NFR BLD AUTO: 0 % — SIGNIFICANT CHANGE UP (ref 0–0.9)
LYMPHOCYTES # BLD AUTO: 1.76 K/UL — SIGNIFICANT CHANGE UP (ref 1–3.3)
LYMPHOCYTES # BLD AUTO: 1.76 K/UL — SIGNIFICANT CHANGE UP (ref 1–3.3)
LYMPHOCYTES # BLD AUTO: 45 % — HIGH (ref 13–44)
LYMPHOCYTES # BLD AUTO: 45 % — HIGH (ref 13–44)
MCHC RBC-ENTMCNC: 30.1 PG — SIGNIFICANT CHANGE UP (ref 27–34)
MCHC RBC-ENTMCNC: 30.1 PG — SIGNIFICANT CHANGE UP (ref 27–34)
MCHC RBC-ENTMCNC: 32.8 G/DL — SIGNIFICANT CHANGE UP (ref 32–36)
MCHC RBC-ENTMCNC: 32.8 G/DL — SIGNIFICANT CHANGE UP (ref 32–36)
MCV RBC AUTO: 91.7 FL — SIGNIFICANT CHANGE UP (ref 80–100)
MCV RBC AUTO: 91.7 FL — SIGNIFICANT CHANGE UP (ref 80–100)
MONOCYTES # BLD AUTO: 0.25 K/UL — SIGNIFICANT CHANGE UP (ref 0–0.9)
MONOCYTES # BLD AUTO: 0.25 K/UL — SIGNIFICANT CHANGE UP (ref 0–0.9)
MONOCYTES NFR BLD AUTO: 6.4 % — SIGNIFICANT CHANGE UP (ref 2–14)
MONOCYTES NFR BLD AUTO: 6.4 % — SIGNIFICANT CHANGE UP (ref 2–14)
NEUTROPHILS # BLD AUTO: 1.65 K/UL — LOW (ref 1.8–7.4)
NEUTROPHILS # BLD AUTO: 1.65 K/UL — LOW (ref 1.8–7.4)
NEUTROPHILS NFR BLD AUTO: 42.2 % — LOW (ref 43–77)
NEUTROPHILS NFR BLD AUTO: 42.2 % — LOW (ref 43–77)
NRBC # BLD: 0 /100 WBCS — SIGNIFICANT CHANGE UP (ref 0–0)
NRBC # BLD: 0 /100 WBCS — SIGNIFICANT CHANGE UP (ref 0–0)
PLATELET # BLD AUTO: 221 K/UL — SIGNIFICANT CHANGE UP (ref 150–400)
PLATELET # BLD AUTO: 221 K/UL — SIGNIFICANT CHANGE UP (ref 150–400)
RBC # BLD: 4.32 M/UL — SIGNIFICANT CHANGE UP (ref 3.8–5.2)
RBC # BLD: 4.32 M/UL — SIGNIFICANT CHANGE UP (ref 3.8–5.2)
RBC # FLD: 13.2 % — SIGNIFICANT CHANGE UP (ref 10.3–14.5)
RBC # FLD: 13.2 % — SIGNIFICANT CHANGE UP (ref 10.3–14.5)
WBC # BLD: 3.91 K/UL — SIGNIFICANT CHANGE UP (ref 3.8–10.5)
WBC # BLD: 3.91 K/UL — SIGNIFICANT CHANGE UP (ref 3.8–10.5)
WBC # FLD AUTO: 3.91 K/UL — SIGNIFICANT CHANGE UP (ref 3.8–10.5)
WBC # FLD AUTO: 3.91 K/UL — SIGNIFICANT CHANGE UP (ref 3.8–10.5)

## 2023-11-27 PROCEDURE — 99213 OFFICE O/P EST LOW 20 MIN: CPT

## 2023-12-01 LAB
ALBUMIN SERPL ELPH-MCNC: 5 G/DL
ALP BLD-CCNC: 46 U/L
ALT SERPL-CCNC: 17 U/L
ANION GAP SERPL CALC-SCNC: 12 MMOL/L
AST SERPL-CCNC: 21 U/L
BILIRUB SERPL-MCNC: 0.4 MG/DL
BUN SERPL-MCNC: 8 MG/DL
CALCIUM SERPL-MCNC: 9.9 MG/DL
CHLORIDE SERPL-SCNC: 102 MMOL/L
CO2 SERPL-SCNC: 26 MMOL/L
CREAT SERPL-MCNC: 0.66 MG/DL
EGFR: 109 ML/MIN/1.73M2
GLUCOSE SERPL-MCNC: 94 MG/DL
LDH SERPL-CCNC: 175 U/L
POTASSIUM SERPL-SCNC: 4.6 MMOL/L
PROT SERPL-MCNC: 7.6 G/DL
SODIUM SERPL-SCNC: 141 MMOL/L

## 2023-12-31 PROBLEM — S59.901A INJURY OF RIGHT ELBOW REGION: Status: ACTIVE | Noted: 2020-09-21

## 2024-11-20 ENCOUNTER — OUTPATIENT (OUTPATIENT)
Dept: OUTPATIENT SERVICES | Facility: HOSPITAL | Age: 49
LOS: 1 days | Discharge: HOME | End: 2024-11-20

## 2024-11-20 DIAGNOSIS — Z90.721 ACQUIRED ABSENCE OF OVARIES, UNILATERAL: Chronic | ICD-10-CM

## 2024-11-20 DIAGNOSIS — Z98.890 OTHER SPECIFIED POSTPROCEDURAL STATES: Chronic | ICD-10-CM

## 2024-11-20 DIAGNOSIS — Z90.49 ACQUIRED ABSENCE OF OTHER SPECIFIED PARTS OF DIGESTIVE TRACT: Chronic | ICD-10-CM

## 2024-11-20 DIAGNOSIS — Z90.79 ACQUIRED ABSENCE OF OTHER GENITAL ORGAN(S): Chronic | ICD-10-CM

## 2024-11-20 DIAGNOSIS — Z92.21 PERSONAL HISTORY OF ANTINEOPLASTIC CHEMOTHERAPY: Chronic | ICD-10-CM

## 2024-11-20 DIAGNOSIS — C85.90 NON-HODGKIN LYMPHOMA, UNSPECIFIED, UNSPECIFIED SITE: ICD-10-CM

## 2024-11-20 NOTE — ASU PREOP CHECKLIST - 2.
SCIP measures initiated
[FreeTextEntry1] : Previous Testing Reviewed
[FreeTextEntry1] : Previous Testing Reviewed

## 2024-11-25 ENCOUNTER — APPOINTMENT (OUTPATIENT)
Dept: HEMATOLOGY ONCOLOGY | Facility: CLINIC | Age: 49
End: 2024-11-25
Payer: MEDICAID

## 2024-11-25 ENCOUNTER — RESULT REVIEW (OUTPATIENT)
Age: 49
End: 2024-11-25

## 2024-11-25 VITALS
TEMPERATURE: 97.3 F | HEART RATE: 68 BPM | SYSTOLIC BLOOD PRESSURE: 115 MMHG | OXYGEN SATURATION: 97 % | WEIGHT: 191.36 LBS | BODY MASS INDEX: 32.67 KG/M2 | DIASTOLIC BLOOD PRESSURE: 74 MMHG | HEIGHT: 63.98 IN | RESPIRATION RATE: 16 BRPM

## 2024-11-25 DIAGNOSIS — C83.398 DIFFUSE LARGE B-CELL LYMPH OF EXTRNOD AND SOLID ORGAN SITES: ICD-10-CM

## 2024-11-25 LAB
BASOPHILS # BLD AUTO: 0.06 K/UL — SIGNIFICANT CHANGE UP (ref 0–0.2)
BASOPHILS NFR BLD AUTO: 1.1 % — SIGNIFICANT CHANGE UP (ref 0–2)
EOSINOPHIL # BLD AUTO: 0.09 K/UL — SIGNIFICANT CHANGE UP (ref 0–0.5)
EOSINOPHIL NFR BLD AUTO: 1.7 % — SIGNIFICANT CHANGE UP (ref 0–6)
HCT VFR BLD CALC: 42.5 % — SIGNIFICANT CHANGE UP (ref 34.5–45)
HGB BLD-MCNC: 13.6 G/DL — SIGNIFICANT CHANGE UP (ref 11.5–15.5)
IMM GRANULOCYTES NFR BLD AUTO: 0.2 % — SIGNIFICANT CHANGE UP (ref 0–0.9)
LYMPHOCYTES # BLD AUTO: 2 K/UL — SIGNIFICANT CHANGE UP (ref 1–3.3)
LYMPHOCYTES # BLD AUTO: 37.3 % — SIGNIFICANT CHANGE UP (ref 13–44)
MCHC RBC-ENTMCNC: 29.6 PG — SIGNIFICANT CHANGE UP (ref 27–34)
MCHC RBC-ENTMCNC: 32 G/DL — SIGNIFICANT CHANGE UP (ref 32–36)
MCV RBC AUTO: 92.6 FL — SIGNIFICANT CHANGE UP (ref 80–100)
MONOCYTES # BLD AUTO: 0.38 K/UL — SIGNIFICANT CHANGE UP (ref 0–0.9)
MONOCYTES NFR BLD AUTO: 7.1 % — SIGNIFICANT CHANGE UP (ref 2–14)
NEUTROPHILS # BLD AUTO: 2.82 K/UL — SIGNIFICANT CHANGE UP (ref 1.8–7.4)
NEUTROPHILS NFR BLD AUTO: 52.6 % — SIGNIFICANT CHANGE UP (ref 43–77)
NRBC # BLD: 0 /100 WBCS — SIGNIFICANT CHANGE UP (ref 0–0)
NRBC BLD-RTO: 0 /100 WBCS — SIGNIFICANT CHANGE UP (ref 0–0)
PLATELET # BLD AUTO: 268 K/UL — SIGNIFICANT CHANGE UP (ref 150–400)
RBC # BLD: 4.59 M/UL — SIGNIFICANT CHANGE UP (ref 3.8–5.2)
RBC # FLD: 12.7 % — SIGNIFICANT CHANGE UP (ref 10.3–14.5)
WBC # BLD: 5.36 K/UL — SIGNIFICANT CHANGE UP (ref 3.8–10.5)
WBC # FLD AUTO: 5.36 K/UL — SIGNIFICANT CHANGE UP (ref 3.8–10.5)

## 2024-11-25 PROCEDURE — 99213 OFFICE O/P EST LOW 20 MIN: CPT

## 2024-11-25 PROCEDURE — G2211 COMPLEX E/M VISIT ADD ON: CPT | Mod: NC

## 2024-11-27 LAB
ALBUMIN SERPL ELPH-MCNC: 4.7 G/DL
ALP BLD-CCNC: 52 U/L
ALT SERPL-CCNC: 17 U/L
ANION GAP SERPL CALC-SCNC: 12 MMOL/L
AST SERPL-CCNC: 24 U/L
BILIRUB SERPL-MCNC: 0.3 MG/DL
BUN SERPL-MCNC: 14 MG/DL
CALCIUM SERPL-MCNC: 9.7 MG/DL
CHLORIDE SERPL-SCNC: 103 MMOL/L
CO2 SERPL-SCNC: 24 MMOL/L
CREAT SERPL-MCNC: 0.68 MG/DL
EGFR: 107 ML/MIN/1.73M2
GLUCOSE SERPL-MCNC: 97 MG/DL
LDH SERPL-CCNC: 227 U/L
POTASSIUM SERPL-SCNC: 4.7 MMOL/L
PROT SERPL-MCNC: 7.6 G/DL
SODIUM SERPL-SCNC: 139 MMOL/L

## 2025-01-27 ENCOUNTER — APPOINTMENT (OUTPATIENT)
Dept: MAMMOGRAPHY | Facility: CLINIC | Age: 50
End: 2025-01-27
Payer: MEDICAID

## 2025-01-27 ENCOUNTER — APPOINTMENT (OUTPATIENT)
Dept: ULTRASOUND IMAGING | Facility: CLINIC | Age: 50
End: 2025-01-27
Payer: MEDICAID

## 2025-01-27 PROCEDURE — 76641 ULTRASOUND BREAST COMPLETE: CPT | Mod: 50

## 2025-01-27 PROCEDURE — 77067 SCR MAMMO BI INCL CAD: CPT

## 2025-01-27 PROCEDURE — 77063 BREAST TOMOSYNTHESIS BI: CPT

## 2025-03-20 NOTE — ED ADULT NURSE NOTE - NS ED NOTE ABUSE RESPONSE YN
patient seen and examined  awake, responsive  hemodynamically stable  s/p colonoscopy  today found to have Severe diverticulosis of the whole colon. Internal hemorrhoids.  at this time can transfer to medical floor  trend cbc
Agree with assessment and plan as above. I attest my time as attending was greater than 50% of the total time of 50 min on patient care (ACP <= 25 mins) on this DOS. Time spent includes review of hospital course, labs, vitals, medical records, patient evaluation, contact with family (when present), discussion of plan with the primary team, coordinating services and documenting encounter.     Patient now s/p colonoscopy showing diverticular disease. Suspect rectal bleeding due to diverticulosis. No further episodes of hematochezia. Hemoglobin slight drop from 11.4 to 10.5. Continue to monitor BMs while starting aspirin. Continue PO PPI daily.
I attest my time as attending was greater than 50% of the total time of 50 min on patient care (ACP <= 25 mins) on this DOS. Time spent includes review of hospital course, labs, vitals, medical records, patient evaluation, contact with family (when present), discussion of plan with the primary team, coordinating services and documenting encounter.     See col note...  No active bleeding.  Likely resolved diverticular bleed.
Yes

## 2025-04-08 ENCOUNTER — APPOINTMENT (OUTPATIENT)
Dept: ORTHOPEDIC SURGERY | Facility: CLINIC | Age: 50
End: 2025-04-08
Payer: MEDICAID

## 2025-04-08 VITALS — HEIGHT: 64 IN | BODY MASS INDEX: 32.44 KG/M2 | WEIGHT: 190 LBS

## 2025-04-08 DIAGNOSIS — C83.398 DIFFUSE LARGE B-CELL LYMPH OF EXTRNOD AND SOLID ORGAN SITES: ICD-10-CM

## 2025-04-08 PROCEDURE — 73562 X-RAY EXAM OF KNEE 3: CPT | Mod: RT

## 2025-04-08 PROCEDURE — 99212 OFFICE O/P EST SF 10 MIN: CPT
